# Patient Record
Sex: FEMALE | Race: WHITE | NOT HISPANIC OR LATINO | Employment: FULL TIME | URBAN - METROPOLITAN AREA
[De-identification: names, ages, dates, MRNs, and addresses within clinical notes are randomized per-mention and may not be internally consistent; named-entity substitution may affect disease eponyms.]

---

## 2019-05-15 ENCOUNTER — OCCMED (OUTPATIENT)
Dept: URGENT CARE | Age: 49
End: 2019-05-15
Payer: OTHER MISCELLANEOUS

## 2019-05-15 ENCOUNTER — APPOINTMENT (OUTPATIENT)
Dept: RADIOLOGY | Age: 49
End: 2019-05-15

## 2019-05-15 DIAGNOSIS — S89.92XA LEG INJURY, LEFT, INITIAL ENCOUNTER: ICD-10-CM

## 2019-05-15 DIAGNOSIS — S89.92XA LEG INJURY, LEFT, INITIAL ENCOUNTER: Primary | ICD-10-CM

## 2019-05-15 DIAGNOSIS — S89.91XA LEG INJURY, RIGHT, INITIAL ENCOUNTER: ICD-10-CM

## 2019-05-15 PROCEDURE — 99283 EMERGENCY DEPT VISIT LOW MDM: CPT

## 2019-05-15 PROCEDURE — G0382 LEV 3 HOSP TYPE B ED VISIT: HCPCS

## 2019-05-15 PROCEDURE — 73590 X-RAY EXAM OF LOWER LEG: CPT

## 2021-04-09 DIAGNOSIS — Z23 ENCOUNTER FOR IMMUNIZATION: ICD-10-CM

## 2022-05-22 ENCOUNTER — APPOINTMENT (OUTPATIENT)
Dept: RADIOLOGY | Age: 52
End: 2022-05-22
Payer: COMMERCIAL

## 2022-05-22 ENCOUNTER — OFFICE VISIT (OUTPATIENT)
Dept: URGENT CARE | Age: 52
End: 2022-05-22
Payer: COMMERCIAL

## 2022-05-22 VITALS
HEART RATE: 83 BPM | HEIGHT: 61 IN | SYSTOLIC BLOOD PRESSURE: 163 MMHG | OXYGEN SATURATION: 100 % | TEMPERATURE: 97.8 F | BODY MASS INDEX: 22.66 KG/M2 | WEIGHT: 120 LBS | DIASTOLIC BLOOD PRESSURE: 82 MMHG | RESPIRATION RATE: 20 BRPM

## 2022-05-22 DIAGNOSIS — S99.921A INJURY OF RIGHT FOOT, INITIAL ENCOUNTER: ICD-10-CM

## 2022-05-22 DIAGNOSIS — S90.31XA CONTUSION OF RIGHT FOOT, INITIAL ENCOUNTER: Primary | ICD-10-CM

## 2022-05-22 PROCEDURE — 99204 OFFICE O/P NEW MOD 45 MIN: CPT | Performed by: FAMILY MEDICINE

## 2022-05-22 PROCEDURE — 73630 X-RAY EXAM OF FOOT: CPT

## 2022-05-22 NOTE — PROGRESS NOTES
[address]        NAME: Ulices Bishop is a 50 y o  female  : 1970    MRN: [id number]  DATE: May 22, 2022  TIME: 3:36 PM    Assessment and Plan   Contusion of right foot, initial encounter Limadarwin Sweeney  1  Contusion of right foot, initial encounter  XR foot 3+ vw right         Patient Instructions     Contusion of the 1st metatarsal of the right foot  X-rays negative for fracture at this time  Short Cam boot given to help offload the area of injury and decreased the pain  No she may wean out of the boot as tolerated  Follow up with PCP in 3-5 days  Proceed to  ER if symptoms worsen  Chief Complaint     Chief Complaint   Patient presents with    Foot Pain     Right foot injury/pain began yesterday         History of Present Illness       82-year-old female presents today complaining of right foot pain  She states that she dropped a candle on her great toe yesterday and the pain continued today  She notes swelling and bruising in the area of injury  She is having difficulty walking due to the pain  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  Review of Systems   Review of Systems   Constitutional: Negative for chills, fatigue and fever  HENT: Negative for postnasal drip and sore throat  Respiratory: Negative for cough and shortness of breath  Cardiovascular: Negative for chest pain and palpitations  Gastrointestinal: Negative for abdominal pain, nausea and vomiting  Genitourinary: Negative for dysuria  Musculoskeletal: Positive for gait problem and joint swelling  Skin: Negative for rash  Neurological: Negative for dizziness, syncope, light-headedness, numbness and headaches  Psychiatric/Behavioral: Negative for agitation and confusion  All other systems reviewed and are negative  Current Medications     No current outpatient medications on file      Current Allergies     Allergies as of 2022 - Reviewed 2022   Allergen Reaction Noted    Sulfa antibiotics Anaphylaxis and Other (See Comments) 05/07/2008    Strawberry extract - food allergy Hives 04/08/2015            The following portions of the patient's history were reviewed and updated as appropriate: allergies, current medications, past family history, past medical history, past social history, past surgical history and problem list      History reviewed  No pertinent past medical history  History reviewed  No pertinent surgical history  History reviewed  No pertinent family history  Medications have been verified  Objective   /82   Pulse 83   Temp 97 8 °F (36 6 °C)   Resp 20   Ht 5' 1" (1 549 m)   Wt 54 4 kg (120 lb)   SpO2 100%   BMI 22 67 kg/m²   No LMP recorded  Physical Exam     Physical Exam  Vitals reviewed  Constitutional:       General: She is not in acute distress  Appearance: Normal appearance  She is not ill-appearing  HENT:      Head: Normocephalic and atraumatic  Eyes:      Extraocular Movements: Extraocular movements intact  Conjunctiva/sclera: Conjunctivae normal    Musculoskeletal:         General: Tenderness and signs of injury present  Cervical back: Normal range of motion  Right foot: Swelling and bony tenderness present  No laceration  Legs:       Comments: Bruising present over the 1st metatarsal    Skin:     General: Skin is warm  Neurological:      General: No focal deficit present  Mental Status: She is alert     Psychiatric:         Mood and Affect: Mood normal          Behavior: Behavior normal          Judgment: Judgment normal

## 2022-05-22 NOTE — PATIENT INSTRUCTIONS
Contusion in Adults   AMBULATORY CARE:   A contusion  is a bruise that appears on your skin after an injury  A bruise happens when small blood vessels tear but skin does not  Blood leaks into nearby tissue, such as soft tissue or muscle  Other signs and symptoms you may have with a contusion:   Pain that increases when you touch the bruise, walk, or use the area around the bruise    Swelling or a lump at the site of the bruise or near it    Red, blue, or black skin that may change to green or yellow after a few days    Stiffness or problems moving the bruised area of your body    Seek care immediately if:   You have new trouble moving the injured area  You have tingling or numbness in or near the injured area  Your hand or foot below the bruise gets cold or turns pale  Call your doctor if:   You find a new lump in the injured area  Your symptoms do not improve with treatment after 4 to 5 days  You have questions or concerns about your condition or care  Treatment  may not be needed  The following may be needed if you have a serious injury:  NSAIDs , such as ibuprofen, help decrease swelling, pain, and fever  This medicine is available with or without a doctor's order  NSAIDs can cause stomach bleeding or kidney problems in certain people  If you take blood thinner medicine, always ask your healthcare provider if NSAIDs are safe for you  Always read the medicine label and follow directions  Prescription pain medicine  may be given  Ask your healthcare provider how to take this medicine safely  Some prescription pain medicines contain acetaminophen  Do not take other medicines that contain acetaminophen without talking to your healthcare provider  Too much acetaminophen may cause liver damage  Prescription pain medicine may cause constipation  Ask your healthcare provider how to prevent or treat constipation  Aspiration  is a procedure to drain pooled blood in your muscle   This may help prevent increased pressure in the muscle  Surgery  may be done to repair a tear in the muscle or relieve pressure in the muscle caused by swelling  Help a contusion heal:   Rest the injured area  or use it less than usual  If you bruised your leg or foot, you may need crutches or a cane to help you walk  This will help you keep weight off your injured body part  Apply ice  to decrease swelling and pain  Ice may also help prevent tissue damage  Use an ice pack, or put crushed ice in a plastic bag  Cover it with a towel and place it on your bruise for 15 to 20 minutes every hour or as directed  Use compression  to support the area and decrease swelling  Wrap an elastic bandage around the area over the bruised muscle  Make sure the bandage is not too tight  You should be able to fit 1 finger between the bandage and your skin  Elevate (raise) your injured body part  above the level of your heart to help decrease pain and swelling  Use pillows, blankets, or rolled towels to elevate the area as often as you can  Do not drink alcohol  as directed  Alcohol may slow healing  Do not stretch injured muscles  right after your injury  Ask your healthcare provider when and how you may safely stretch after your injury  Gentle stretches can help increase your flexibility  Do not massage the area or put heating pads  on the bruise right after your injury  Heat and massage may slow healing  Your healthcare provider may tell you to apply heat after several days  At that time, heat will start to help the injury heal     Follow up with your doctor as directed:  Write down your questions so you remember to ask them during your visits  © [employer] 2022 Information is for End User's use only and may not be sold, redistributed or otherwise used for commercial purposes   All illustrations and images included in CareNotes® are the copyrighted property of [employer]  or [employer]  The above information is an  only  It is not intended as medical advice for individual conditions or treatments  Talk to your doctor, nurse or pharmacist before following any medical regimen to see if it is safe and effective for you

## 2023-02-21 ENCOUNTER — HOSPITAL ENCOUNTER (INPATIENT)
Facility: HOSPITAL | Age: 53
LOS: 1 days | Discharge: HOME | DRG: 138 | End: 2023-02-24
Attending: EMERGENCY MEDICINE | Admitting: INTERNAL MEDICINE
Payer: COMMERCIAL

## 2023-02-21 ENCOUNTER — APPOINTMENT (EMERGENCY)
Dept: CT IMAGING | Facility: HOSPITAL | Age: 53
DRG: 138 | End: 2023-02-21
Payer: COMMERCIAL

## 2023-02-21 DIAGNOSIS — K04.7 DENTAL ABSCESS: Primary | ICD-10-CM

## 2023-02-21 LAB
ALBUMIN SERPL BCP-MCNC: 4.2 G/DL (ref 3.5–5)
ALP SERPL-CCNC: 49 U/L (ref 34–104)
ALT SERPL W P-5'-P-CCNC: 8 U/L (ref 7–52)
ANION GAP SERPL CALCULATED.3IONS-SCNC: 11 MMOL/L (ref 4–13)
APTT PPP: 30 SECONDS (ref 23–37)
AST SERPL W P-5'-P-CCNC: 14 U/L (ref 13–39)
BASOPHILS # BLD AUTO: 0.06 THOUSANDS/ÂΜL (ref 0–0.1)
BASOPHILS NFR BLD AUTO: 1 % (ref 0–1)
BILIRUB SERPL-MCNC: 0.52 MG/DL (ref 0.2–1)
BUN SERPL-MCNC: 11 MG/DL (ref 5–25)
CALCIUM SERPL-MCNC: 9.4 MG/DL (ref 8.4–10.2)
CHLORIDE SERPL-SCNC: 102 MMOL/L (ref 96–108)
CO2 SERPL-SCNC: 21 MMOL/L (ref 21–32)
CREAT SERPL-MCNC: 0.87 MG/DL (ref 0.6–1.3)
EOSINOPHIL # BLD AUTO: 0 THOUSAND/ÂΜL (ref 0–0.61)
EOSINOPHIL NFR BLD AUTO: 0 % (ref 0–6)
ERYTHROCYTE [DISTWIDTH] IN BLOOD BY AUTOMATED COUNT: 12.3 % (ref 11.6–15.1)
GFR SERPL CREATININE-BSD FRML MDRD: 77 ML/MIN/1.73SQ M
GLUCOSE SERPL-MCNC: 95 MG/DL (ref 65–140)
HCT VFR BLD AUTO: 39.3 % (ref 34.8–46.1)
HGB BLD-MCNC: 13.4 G/DL (ref 11.5–15.4)
IMM GRANULOCYTES # BLD AUTO: 0.03 THOUSAND/UL (ref 0–0.2)
IMM GRANULOCYTES NFR BLD AUTO: 0 % (ref 0–2)
INR PPP: 1.03 (ref 0.84–1.19)
LACTATE SERPL-SCNC: 1.1 MMOL/L (ref 0.5–2)
LYMPHOCYTES # BLD AUTO: 0.74 THOUSANDS/ÂΜL (ref 0.6–4.47)
LYMPHOCYTES NFR BLD AUTO: 9 % (ref 14–44)
MCH RBC QN AUTO: 31.8 PG (ref 26.8–34.3)
MCHC RBC AUTO-ENTMCNC: 34.1 G/DL (ref 31.4–37.4)
MCV RBC AUTO: 93 FL (ref 82–98)
MONOCYTES # BLD AUTO: 0.57 THOUSAND/ÂΜL (ref 0.17–1.22)
MONOCYTES NFR BLD AUTO: 7 % (ref 4–12)
NEUTROPHILS # BLD AUTO: 6.99 THOUSANDS/ÂΜL (ref 1.85–7.62)
NEUTS SEG NFR BLD AUTO: 83 % (ref 43–75)
NRBC BLD AUTO-RTO: 0 /100 WBCS
PLATELET # BLD AUTO: 205 THOUSANDS/UL (ref 149–390)
PMV BLD AUTO: 10.5 FL (ref 8.9–12.7)
POTASSIUM SERPL-SCNC: 3.7 MMOL/L (ref 3.5–5.3)
PROCALCITONIN SERPL-MCNC: 0.31 NG/ML
PROT SERPL-MCNC: 7.2 G/DL (ref 6.4–8.4)
PROTHROMBIN TIME: 13.7 SECONDS (ref 11.6–14.5)
RBC # BLD AUTO: 4.21 MILLION/UL (ref 3.81–5.12)
SODIUM SERPL-SCNC: 134 MMOL/L (ref 135–147)
WBC # BLD AUTO: 8.39 THOUSAND/UL (ref 4.31–10.16)

## 2023-02-21 PROCEDURE — 96375 TX/PRO/DX INJ NEW DRUG ADDON: CPT

## 2023-02-21 PROCEDURE — 70491 CT SOFT TISSUE NECK W/DYE: CPT

## 2023-02-21 PROCEDURE — 99285 EMERGENCY DEPT VISIT HI MDM: CPT | Performed by: EMERGENCY MEDICINE

## 2023-02-21 PROCEDURE — 99285 EMERGENCY DEPT VISIT HI MDM: CPT

## 2023-02-21 PROCEDURE — 85025 COMPLETE CBC W/AUTO DIFF WBC: CPT

## 2023-02-21 PROCEDURE — 80053 COMPREHEN METABOLIC PANEL: CPT

## 2023-02-21 PROCEDURE — 96365 THER/PROPH/DIAG IV INF INIT: CPT

## 2023-02-21 PROCEDURE — 36415 COLL VENOUS BLD VENIPUNCTURE: CPT

## 2023-02-21 PROCEDURE — 87040 BLOOD CULTURE FOR BACTERIA: CPT

## 2023-02-21 PROCEDURE — 84145 PROCALCITONIN (PCT): CPT

## 2023-02-21 PROCEDURE — 83605 ASSAY OF LACTIC ACID: CPT

## 2023-02-21 PROCEDURE — 85610 PROTHROMBIN TIME: CPT

## 2023-02-21 PROCEDURE — 96361 HYDRATE IV INFUSION ADD-ON: CPT

## 2023-02-21 PROCEDURE — 85730 THROMBOPLASTIN TIME PARTIAL: CPT

## 2023-02-21 PROCEDURE — 96376 TX/PRO/DX INJ SAME DRUG ADON: CPT

## 2023-02-21 PROCEDURE — G1004 CDSM NDSC: HCPCS

## 2023-02-21 RX ORDER — ACETAMINOPHEN 325 MG/1
975 TABLET ORAL ONCE
Status: COMPLETED | OUTPATIENT
Start: 2023-02-21 | End: 2023-02-21

## 2023-02-21 RX ORDER — ONDANSETRON 2 MG/ML
4 INJECTION INTRAMUSCULAR; INTRAVENOUS ONCE
Status: COMPLETED | OUTPATIENT
Start: 2023-02-21 | End: 2023-02-21

## 2023-02-21 RX ORDER — MORPHINE SULFATE 4 MG/ML
4 INJECTION, SOLUTION INTRAMUSCULAR; INTRAVENOUS ONCE
Status: COMPLETED | OUTPATIENT
Start: 2023-02-21 | End: 2023-02-21

## 2023-02-21 RX ADMIN — SODIUM CHLORIDE 3 G: 9 INJECTION, SOLUTION INTRAVENOUS at 22:04

## 2023-02-21 RX ADMIN — ONDANSETRON 4 MG: 2 INJECTION INTRAMUSCULAR; INTRAVENOUS at 20:01

## 2023-02-21 RX ADMIN — MORPHINE SULFATE 2 MG: 2 INJECTION, SOLUTION INTRAMUSCULAR; INTRAVENOUS at 20:58

## 2023-02-21 RX ADMIN — MORPHINE SULFATE 4 MG: 4 INJECTION INTRAVENOUS at 20:01

## 2023-02-21 RX ADMIN — SODIUM CHLORIDE 1000 ML: 0.9 INJECTION, SOLUTION INTRAVENOUS at 20:01

## 2023-02-21 RX ADMIN — ACETAMINOPHEN 975 MG: 325 TABLET, FILM COATED ORAL at 20:01

## 2023-02-21 RX ADMIN — IOHEXOL 100 ML: 350 INJECTION, SOLUTION INTRAVENOUS at 20:54

## 2023-02-21 NOTE — LETTER
[address] [address]  Dept: [phone]    February 24, 2023     Patient: Antoine Tarango   YOB: 1970   Date of Visit: 2/21/2023       To Whom it May Concern:    Mercedes Souza is under my professional care  She was seen in the hospital from 2/21/2023 to 02/24/23  She may return to work on Monday 2/27 without limitations  If you have any questions or concerns, please don't hesitate to call           Sincerely,            Eric Quesada PA-C

## 2023-02-22 ENCOUNTER — ANESTHESIA (OUTPATIENT)
Dept: PERIOP | Facility: HOSPITAL | Age: 53
End: 2023-02-22

## 2023-02-22 ENCOUNTER — ANESTHESIA EVENT (OUTPATIENT)
Dept: PERIOP | Facility: HOSPITAL | Age: 53
End: 2023-02-22

## 2023-02-22 PROBLEM — K04.7 DENTAL ABSCESS: Status: ACTIVE | Noted: 2023-02-22

## 2023-02-22 PROBLEM — E87.1 HYPONATREMIA: Status: ACTIVE | Noted: 2023-02-22

## 2023-02-22 PROBLEM — K08.89 PAIN, DENTAL: Status: ACTIVE | Noted: 2023-02-22

## 2023-02-22 LAB
ANION GAP SERPL CALCULATED.3IONS-SCNC: 8 MMOL/L (ref 4–13)
BASOPHILS # BLD AUTO: 0.06 THOUSANDS/ÂΜL (ref 0–0.1)
BASOPHILS NFR BLD AUTO: 1 % (ref 0–1)
BUN SERPL-MCNC: 11 MG/DL (ref 5–25)
CALCIUM SERPL-MCNC: 8.2 MG/DL (ref 8.4–10.2)
CHLORIDE SERPL-SCNC: 107 MMOL/L (ref 96–108)
CO2 SERPL-SCNC: 22 MMOL/L (ref 21–32)
CREAT SERPL-MCNC: 0.91 MG/DL (ref 0.6–1.3)
EOSINOPHIL # BLD AUTO: 0.05 THOUSAND/ÂΜL (ref 0–0.61)
EOSINOPHIL NFR BLD AUTO: 1 % (ref 0–6)
ERYTHROCYTE [DISTWIDTH] IN BLOOD BY AUTOMATED COUNT: 12.6 % (ref 11.6–15.1)
GFR SERPL CREATININE-BSD FRML MDRD: 73 ML/MIN/1.73SQ M
GLUCOSE P FAST SERPL-MCNC: 84 MG/DL (ref 65–99)
GLUCOSE SERPL-MCNC: 84 MG/DL (ref 65–140)
HCT VFR BLD AUTO: 35.7 % (ref 34.8–46.1)
HGB BLD-MCNC: 11.7 G/DL (ref 11.5–15.4)
IMM GRANULOCYTES # BLD AUTO: 0.04 THOUSAND/UL (ref 0–0.2)
IMM GRANULOCYTES NFR BLD AUTO: 1 % (ref 0–2)
LYMPHOCYTES # BLD AUTO: 1.49 THOUSANDS/ÂΜL (ref 0.6–4.47)
LYMPHOCYTES NFR BLD AUTO: 21 % (ref 14–44)
MCH RBC QN AUTO: 31.2 PG (ref 26.8–34.3)
MCHC RBC AUTO-ENTMCNC: 32.8 G/DL (ref 31.4–37.4)
MCV RBC AUTO: 95 FL (ref 82–98)
MONOCYTES # BLD AUTO: 1.01 THOUSAND/ÂΜL (ref 0.17–1.22)
MONOCYTES NFR BLD AUTO: 14 % (ref 4–12)
NEUTROPHILS # BLD AUTO: 4.35 THOUSANDS/ÂΜL (ref 1.85–7.62)
NEUTS SEG NFR BLD AUTO: 62 % (ref 43–75)
NRBC BLD AUTO-RTO: 0 /100 WBCS
PLATELET # BLD AUTO: 170 THOUSANDS/UL (ref 149–390)
PMV BLD AUTO: 10.2 FL (ref 8.9–12.7)
POTASSIUM SERPL-SCNC: 3.7 MMOL/L (ref 3.5–5.3)
RBC # BLD AUTO: 3.75 MILLION/UL (ref 3.81–5.12)
SODIUM SERPL-SCNC: 137 MMOL/L (ref 135–147)
WBC # BLD AUTO: 7 THOUSAND/UL (ref 4.31–10.16)

## 2023-02-22 PROCEDURE — RECHECK: Performed by: PHYSICIAN ASSISTANT

## 2023-02-22 PROCEDURE — 0J910ZZ DRAINAGE OF FACE SUBCUTANEOUS TISSUE AND FASCIA, OPEN APPROACH: ICD-10-PCS | Performed by: DENTIST

## 2023-02-22 PROCEDURE — 0W930ZZ DRAINAGE OF ORAL CAVITY AND THROAT, OPEN APPROACH: ICD-10-PCS | Performed by: DENTIST

## 2023-02-22 PROCEDURE — 87070 CULTURE OTHR SPECIMN AEROBIC: CPT | Performed by: DENTIST

## 2023-02-22 PROCEDURE — 0CDWXZ0 EXTRACTION OF UPPER TOOTH, SINGLE, EXTERNAL APPROACH: ICD-10-PCS | Performed by: DENTIST

## 2023-02-22 PROCEDURE — 87205 SMEAR GRAM STAIN: CPT | Performed by: DENTIST

## 2023-02-22 PROCEDURE — 85025 COMPLETE CBC W/AUTO DIFF WBC: CPT | Performed by: PHYSICIAN ASSISTANT

## 2023-02-22 PROCEDURE — 80048 BASIC METABOLIC PNL TOTAL CA: CPT | Performed by: PHYSICIAN ASSISTANT

## 2023-02-22 PROCEDURE — 99222 1ST HOSP IP/OBS MODERATE 55: CPT | Performed by: INTERNAL MEDICINE

## 2023-02-22 PROCEDURE — 87075 CULTR BACTERIA EXCEPT BLOOD: CPT | Performed by: DENTIST

## 2023-02-22 RX ORDER — ONDANSETRON 2 MG/ML
INJECTION INTRAMUSCULAR; INTRAVENOUS AS NEEDED
Status: DISCONTINUED | OUTPATIENT
Start: 2023-02-22 | End: 2023-02-22

## 2023-02-22 RX ORDER — PROPOFOL 10 MG/ML
INJECTION, EMULSION INTRAVENOUS AS NEEDED
Status: DISCONTINUED | OUTPATIENT
Start: 2023-02-22 | End: 2023-02-22

## 2023-02-22 RX ORDER — ACETAMINOPHEN 325 MG/1
650 TABLET ORAL EVERY 6 HOURS PRN
Status: DISCONTINUED | OUTPATIENT
Start: 2023-02-22 | End: 2023-02-22

## 2023-02-22 RX ORDER — FENTANYL CITRATE 50 UG/ML
INJECTION, SOLUTION INTRAMUSCULAR; INTRAVENOUS AS NEEDED
Status: DISCONTINUED | OUTPATIENT
Start: 2023-02-22 | End: 2023-02-22

## 2023-02-22 RX ORDER — ONDANSETRON 2 MG/ML
4 INJECTION INTRAMUSCULAR; INTRAVENOUS ONCE AS NEEDED
Status: DISCONTINUED | OUTPATIENT
Start: 2023-02-22 | End: 2023-02-22 | Stop reason: HOSPADM

## 2023-02-22 RX ORDER — BACITRACIN, NEOMYCIN, POLYMYXIN B 400; 3.5; 5 [USP'U]/G; MG/G; [USP'U]/G
OINTMENT TOPICAL AS NEEDED
Status: DISCONTINUED | OUTPATIENT
Start: 2023-02-22 | End: 2023-02-22 | Stop reason: HOSPADM

## 2023-02-22 RX ORDER — BUPIVACAINE HYDROCHLORIDE 2.5 MG/ML
INJECTION, SOLUTION EPIDURAL; INFILTRATION; INTRACAUDAL AS NEEDED
Status: DISCONTINUED | OUTPATIENT
Start: 2023-02-22 | End: 2023-02-22 | Stop reason: HOSPADM

## 2023-02-22 RX ORDER — MIDAZOLAM HYDROCHLORIDE 2 MG/2ML
INJECTION, SOLUTION INTRAMUSCULAR; INTRAVENOUS AS NEEDED
Status: DISCONTINUED | OUTPATIENT
Start: 2023-02-22 | End: 2023-02-22

## 2023-02-22 RX ORDER — MAGNESIUM HYDROXIDE 1200 MG/15ML
LIQUID ORAL AS NEEDED
Status: DISCONTINUED | OUTPATIENT
Start: 2023-02-22 | End: 2023-02-22 | Stop reason: HOSPADM

## 2023-02-22 RX ORDER — METOCLOPRAMIDE HYDROCHLORIDE 5 MG/ML
10 INJECTION INTRAMUSCULAR; INTRAVENOUS ONCE AS NEEDED
Status: DISCONTINUED | OUTPATIENT
Start: 2023-02-22 | End: 2023-02-22 | Stop reason: HOSPADM

## 2023-02-22 RX ORDER — LIDOCAINE HYDROCHLORIDE 10 MG/ML
INJECTION, SOLUTION EPIDURAL; INFILTRATION; INTRACAUDAL; PERINEURAL AS NEEDED
Status: DISCONTINUED | OUTPATIENT
Start: 2023-02-22 | End: 2023-02-22 | Stop reason: HOSPADM

## 2023-02-22 RX ORDER — DEXAMETHASONE SODIUM PHOSPHATE 10 MG/ML
INJECTION, SOLUTION INTRAMUSCULAR; INTRAVENOUS AS NEEDED
Status: DISCONTINUED | OUTPATIENT
Start: 2023-02-22 | End: 2023-02-22

## 2023-02-22 RX ORDER — KETOROLAC TROMETHAMINE 30 MG/ML
15 INJECTION, SOLUTION INTRAMUSCULAR; INTRAVENOUS EVERY 6 HOURS PRN
Status: DISCONTINUED | OUTPATIENT
Start: 2023-02-22 | End: 2023-02-22

## 2023-02-22 RX ORDER — LIDOCAINE HYDROCHLORIDE 10 MG/ML
INJECTION, SOLUTION EPIDURAL; INFILTRATION; INTRACAUDAL; PERINEURAL AS NEEDED
Status: DISCONTINUED | OUTPATIENT
Start: 2023-02-22 | End: 2023-02-22

## 2023-02-22 RX ORDER — KETOROLAC TROMETHAMINE 30 MG/ML
15 INJECTION, SOLUTION INTRAMUSCULAR; INTRAVENOUS ONCE
Status: COMPLETED | OUTPATIENT
Start: 2023-02-22 | End: 2023-02-22

## 2023-02-22 RX ORDER — OXYCODONE HYDROCHLORIDE 5 MG/1
5 TABLET ORAL EVERY 6 HOURS PRN
Status: DISCONTINUED | OUTPATIENT
Start: 2023-02-22 | End: 2023-02-22

## 2023-02-22 RX ORDER — ACETAMINOPHEN 325 MG/1
975 TABLET ORAL EVERY 8 HOURS SCHEDULED
Status: DISCONTINUED | OUTPATIENT
Start: 2023-02-22 | End: 2023-02-24 | Stop reason: HOSPADM

## 2023-02-22 RX ORDER — KETOROLAC TROMETHAMINE 30 MG/ML
15 INJECTION, SOLUTION INTRAMUSCULAR; INTRAVENOUS EVERY 6 HOURS PRN
Status: DISPENSED | OUTPATIENT
Start: 2023-02-22 | End: 2023-02-23

## 2023-02-22 RX ORDER — HYDROMORPHONE HCL IN WATER/PF 6 MG/30 ML
0.2 PATIENT CONTROLLED ANALGESIA SYRINGE INTRAVENOUS EVERY 4 HOURS PRN
Status: DISCONTINUED | OUTPATIENT
Start: 2023-02-22 | End: 2023-02-24 | Stop reason: HOSPADM

## 2023-02-22 RX ORDER — SODIUM CHLORIDE, SODIUM LACTATE, POTASSIUM CHLORIDE, CALCIUM CHLORIDE 600; 310; 30; 20 MG/100ML; MG/100ML; MG/100ML; MG/100ML
INJECTION, SOLUTION INTRAVENOUS CONTINUOUS PRN
Status: DISCONTINUED | OUTPATIENT
Start: 2023-02-22 | End: 2023-02-22

## 2023-02-22 RX ORDER — HYDROMORPHONE HCL/PF 1 MG/ML
0.5 SYRINGE (ML) INJECTION EVERY 4 HOURS PRN
Status: DISCONTINUED | OUTPATIENT
Start: 2023-02-22 | End: 2023-02-22

## 2023-02-22 RX ORDER — SUCCINYLCHOLINE/SOD CL,ISO/PF 100 MG/5ML
SYRINGE (ML) INTRAVENOUS AS NEEDED
Status: DISCONTINUED | OUTPATIENT
Start: 2023-02-22 | End: 2023-02-22

## 2023-02-22 RX ORDER — HYDROMORPHONE HCL/PF 1 MG/ML
0.5 SYRINGE (ML) INJECTION
Status: COMPLETED | OUTPATIENT
Start: 2023-02-22 | End: 2023-02-22

## 2023-02-22 RX ADMIN — DEXAMETHASONE SODIUM PHOSPHATE 10 MG: 10 INJECTION, SOLUTION INTRAMUSCULAR; INTRAVENOUS at 18:08

## 2023-02-22 RX ADMIN — ACETAMINOPHEN 975 MG: 325 TABLET ORAL at 21:24

## 2023-02-22 RX ADMIN — KETOROLAC TROMETHAMINE 15 MG: 30 INJECTION, SOLUTION INTRAMUSCULAR at 21:24

## 2023-02-22 RX ADMIN — ACETAMINOPHEN 975 MG: 325 TABLET ORAL at 14:15

## 2023-02-22 RX ADMIN — ACETAMINOPHEN 975 MG: 325 TABLET ORAL at 07:53

## 2023-02-22 RX ADMIN — MORPHINE SULFATE 2 MG: 2 INJECTION, SOLUTION INTRAMUSCULAR; INTRAVENOUS at 00:27

## 2023-02-22 RX ADMIN — Medication 80 MG: at 18:05

## 2023-02-22 RX ADMIN — OXYCODONE HYDROCHLORIDE 5 MG: 5 TABLET ORAL at 04:08

## 2023-02-22 RX ADMIN — FENTANYL CITRATE 100 MCG: 50 INJECTION, SOLUTION INTRAMUSCULAR; INTRAVENOUS at 18:04

## 2023-02-22 RX ADMIN — PROPOFOL 150 MG: 10 INJECTION, EMULSION INTRAVENOUS at 18:04

## 2023-02-22 RX ADMIN — SODIUM CHLORIDE 3 G: 9 INJECTION, SOLUTION INTRAVENOUS at 18:08

## 2023-02-22 RX ADMIN — SODIUM CHLORIDE 3 G: 9 INJECTION, SOLUTION INTRAVENOUS at 23:56

## 2023-02-22 RX ADMIN — SODIUM CHLORIDE 3 G: 9 INJECTION, SOLUTION INTRAVENOUS at 11:02

## 2023-02-22 RX ADMIN — ONDANSETRON 4 MG: 2 INJECTION INTRAMUSCULAR; INTRAVENOUS at 18:08

## 2023-02-22 RX ADMIN — HYDROMORPHONE HYDROCHLORIDE 0.5 MG: 1 INJECTION, SOLUTION INTRAMUSCULAR; INTRAVENOUS; SUBCUTANEOUS at 18:52

## 2023-02-22 RX ADMIN — KETOROLAC TROMETHAMINE 15 MG: 30 INJECTION, SOLUTION INTRAMUSCULAR at 14:15

## 2023-02-22 RX ADMIN — HYDROMORPHONE HYDROCHLORIDE 0.5 MG: 1 INJECTION, SOLUTION INTRAMUSCULAR; INTRAVENOUS; SUBCUTANEOUS at 19:43

## 2023-02-22 RX ADMIN — KETOROLAC TROMETHAMINE 15 MG: 30 INJECTION, SOLUTION INTRAMUSCULAR at 00:26

## 2023-02-22 RX ADMIN — HYDROMORPHONE HYDROCHLORIDE 0.5 MG: 1 INJECTION, SOLUTION INTRAMUSCULAR; INTRAVENOUS; SUBCUTANEOUS at 19:21

## 2023-02-22 RX ADMIN — MIDAZOLAM HYDROCHLORIDE 2 MG: 1 INJECTION, SOLUTION INTRAMUSCULAR; INTRAVENOUS at 17:59

## 2023-02-22 RX ADMIN — LIDOCAINE HYDROCHLORIDE 40 MG: 10 INJECTION, SOLUTION EPIDURAL; INFILTRATION; INTRACAUDAL; PERINEURAL at 18:04

## 2023-02-22 RX ADMIN — HYDROMORPHONE HYDROCHLORIDE 0.5 MG: 1 INJECTION, SOLUTION INTRAMUSCULAR; INTRAVENOUS; SUBCUTANEOUS at 19:05

## 2023-02-22 RX ADMIN — SODIUM CHLORIDE 3 G: 9 INJECTION, SOLUTION INTRAVENOUS at 04:08

## 2023-02-22 RX ADMIN — SODIUM CHLORIDE, SODIUM LACTATE, POTASSIUM CHLORIDE, AND CALCIUM CHLORIDE: .6; .31; .03; .02 INJECTION, SOLUTION INTRAVENOUS at 17:51

## 2023-02-22 RX ADMIN — KETOROLAC TROMETHAMINE 15 MG: 30 INJECTION, SOLUTION INTRAMUSCULAR at 07:53

## 2023-02-22 NOTE — PROGRESS NOTES
[facility]  Progress Note - Jose Genera 1970, 48 y o  female MRN: [id number]  Unit/Bed#: W -01 Encounter: [id number]  Primary Care Provider: Avinash Olivera   Date and time admitted to hospital: 2/21/2023  7:18 PM    * Dental abscess  Assessment & Plan  · Presented with left lower tooth pain, left sided facial swelling and subjective fevers/ chills  · CT neck shows left mandibular subperiosteal 1 4 cm abscess along the lingual cortex of the mandible  No inflammation in the left sublingual or submandibular spaces or oral cavity  Periapical abscess involving the left mandibular 1st molar suggesting odontogenic source of infection  · Continue IV Unasyn  · OMFS consult  · Keep NPO  · Pain control  · Follow up blood cultures        Hyponatremia  Assessment & Plan  · Na 134 on presentation  Now resolved   · Received 1L bolus of NS in the ED  · Monitor BMP           VTE Pharmacologic Prophylaxis: VTE Score: 2 Low Risk (Score 0-2) - Encourage Ambulation  Patient Centered Rounds: I performed bedside rounds with nursing staff today  Discussions with Specialists or Other Care Team Provider: Discussed with RN, CM    Education and Discussions with Family / Patient: Patient declined call to   Total Time Spent on Date of Encounter in care of patient: 35 minutes This time was spent on one or more of the following: performing physical exam; counseling and coordination of care; obtaining or reviewing history; documenting in the medical record; reviewing/ordering tests, medications or procedures; communicating with other healthcare professionals and discussing with patient's family/caregivers      Current Length of Stay: 0 day(s)  Current Patient Status: Observation   Certification Statement: The patient will continue to require additional inpatient hospital stay due to pending OMFS intervention   Discharge Plan: Anticipate discharge in 24-48 hrs to home     Code Status: Level 1 - Full Code    Subjective:   Patient reports pain is controlled when she has her pain meds  Denies fevers or chills  Reports being thirsty  Objective:     Vitals:   Temp (24hrs), Av 2 °F (37 3 °C), Min:98 4 °F (36 9 °C), Max:100 °F (37 8 °C)    Temp:  [98 4 °F (36 9 °C)-100 °F (37 8 °C)] 98 4 °F (36 9 °C)  HR:  [] 66  Resp:  [18] 18  BP: (106-132)/(66-87) 128/84  SpO2:  [96 %-98 %] 98 %  Body mass index is 20 49 kg/m²  Input and Output Summary (last 24 hours): Intake/Output Summary (Last 24 hours) at 2023 0737  Last data filed at 2023 2244  Gross per 24 hour   Intake 1100 ml   Output --   Net 1100 ml       Physical Exam:   Physical Exam  Constitutional:       General: She is not in acute distress  Appearance: Normal appearance  She is normal weight  She is not ill-appearing or diaphoretic  HENT:      Head: Normocephalic and atraumatic  Mouth/Throat:      Mouth: Mucous membranes are moist       Dentition: Dental caries and dental abscesses present  Eyes:      General: No scleral icterus  Pupils: Pupils are equal, round, and reactive to light  Cardiovascular:      Rate and Rhythm: Normal rate and regular rhythm  Pulses: Normal pulses  Heart sounds: Normal heart sounds, S1 normal and S2 normal  No murmur heard  No systolic murmur is present  No diastolic murmur is present  No gallop  No S3 or S4 sounds  Pulmonary:      Effort: Pulmonary effort is normal  No accessory muscle usage or respiratory distress  Breath sounds: Normal breath sounds  No stridor  No decreased breath sounds, wheezing, rhonchi or rales  Chest:      Chest wall: No tenderness  Abdominal:      General: Bowel sounds are normal  There is no distension  Palpations: Abdomen is soft  Tenderness: There is no abdominal tenderness  There is no guarding  Musculoskeletal:      Right lower leg: No edema  Left lower leg: No edema  Skin:     General: Skin is warm and dry  Coloration: Skin is not jaundiced  Neurological:      General: No focal deficit present  Mental Status: She is alert  Mental status is at baseline  Motor: No tremor or seizure activity  Psychiatric:         Behavior: Behavior is cooperative  Additional Data:     Labs:  Results from last 7 days   Lab Units 02/22/23  0430   WBC Thousand/uL 7 00   HEMOGLOBIN g/dL 11 7   HEMATOCRIT % 35 7   PLATELETS Thousands/uL 170   NEUTROS PCT % 62   LYMPHS PCT % 21   MONOS PCT % 14*   EOS PCT % 1     Results from last 7 days   Lab Units 02/22/23 0430 02/21/23 2000   SODIUM mmol/L 137 134*   POTASSIUM mmol/L 3 7 3 7   CHLORIDE mmol/L 107 102   CO2 mmol/L 22 21   BUN mg/dL 11 11   CREATININE mg/dL 0 91 0 87   ANION GAP mmol/L 8 11   CALCIUM mg/dL 8 2* 9 4   ALBUMIN g/dL  --  4 2   TOTAL BILIRUBIN mg/dL  --  0 52   ALK PHOS U/L  --  49   ALT U/L  --  8   AST U/L  --  14   GLUCOSE RANDOM mg/dL 84 95     Results from last 7 days   Lab Units 02/21/23 2000   INR  1 03             Results from last 7 days   Lab Units 02/21/23 2000   LACTIC ACID mmol/L 1 1   PROCALCITONIN ng/ml 0 31*       Lines/Drains:  Invasive Devices     Peripheral Intravenous Line  Duration           Peripheral IV 02/21/23 Proximal;Right;Ventral (anterior) Forearm <1 day                      Imaging: No pertinent imaging reviewed  Recent Cultures (last 7 days):   Results from last 7 days   Lab Units 02/21/23 2000   BLOOD CULTURE  Received in Microbiology Lab  Culture in Progress  Received in Microbiology Lab  Culture in Progress         Last 24 Hours Medication List:   Current Facility-Administered Medications   Medication Dose Route Frequency Provider Last Rate   • acetaminophen  975 mg Oral Q8H Alejandra Divers PA-C     • ampicillin-sulbactam  3 g Intravenous Q6H Cathleen Serum, PA-C 3 g (02/22/23 8674)   • ketorolac  15 mg Intravenous Q6H PRN Cathleen Serum, PA-C     • ketorolac  15 mg Intravenous Q6H PRN Fili Bonilla PA-C     • oxyCODONE  5 mg Oral Q6H PRN Eric Reyes PA-C          Today, Patient Was Seen By: Fili Bonilla PA-C    **Please Note: This note may have been constructed using a voice recognition system  **

## 2023-02-22 NOTE — OP NOTE
OPERATIVE REPORT  PATIENT NAME: Sultana Marquez    :  1970  MRN: [id number]  Pt Location: AN OR ROOM 03    SURGERY DATE: 2023    Surgeon(s) and Role:     Yaima Griggs DMD - Primary    Preop Diagnosis:  Dental abscess [K04 7]    Post-Op Diagnosis Codes:     * Dental abscess [K04 7]      Procedures:  Removal root tips tooth #18(it should be noted this was documented as 23 at the time of consultation however exam was limited secondary to trismus)  Extraoral incision and drainage left sublingual space  Extraoral incision drainage left submandibular space  Extraoral incision and drainage left  space  Extraoral incision and drainage submental space  Intraoral incision and drainage left sublingual space  Intraoral incision and drainage left submandibular space  Intraoral incision and drainage dentoalveolar structure associate with tooth #18    Specimen(s):  ID Type Source Tests Collected by Time Destination   A : Left Facial Wound Tissue Soft Tissue, Other ANAEROBIC CULTURE AND GRAM STAIN, CULTURE, TISSUE AND GRAM STAIN Nate Allred DMD 2023 1820        Estimated Blood Loss:   Minimal    Drains:  No drains    Anesthesia Type:   General    Operative Indications:  Dental abscess [K04 7]      Operative Findings:  Root tips tooth #66    Complications:   None    Procedure and Technique:  The patient was greeted in the preoperative area  All the risks and benefits of the procedure were once again explained and the risks of sinus communication as well as lower chin and lip numbness were explained in detail all questions were answered  Consent had already been signed  Care was then handed back to the anesthesia team     The patient was brought into the operating room by the anesthesia team and the patient was placed in a supine position where the patient remained for the rest of the case  Anesthesia was able to establish an orotracheal intubation without any complications   Care was then handed back to the OMFS team     Patient was draped in sterile manner timeout was performed in which the patient was correctly identified by name medical record number as well as a site of the procedure be performed  Patient had received preoperative  mg clindamycin Antibiotics  Once a timeout was completed oral cavity was thoroughly suctioned with the impokkauer suction the moist  packing was used it as a throat pack  Patient was given local anesthesia at the sites of the extractions with 1% lidocaine with 1-100,000 epinephrine as local anesthesia per anesthesia record  Patient was also given approximately half percent Marcaine with 1-200,000 epinephrine also at the sites per anesthesia record  A periosteal elevator was used to separate the gingiva from the teeth  Full thickness mucoperiosteal flaps elevated at all extraction sites  Bone was removed around the teeth  Root tips were sectioned  then extracted without complication  Surgical sites were thoroughly curetted, bone filed, and irrigated with sterile saline  Closure with 3-0 chromic gut sutures  Next the oral cavity was thoroughly irrigated with sterile saline and suctioned with the impokkauer suction  The moist throat packing was removed and the oropharynx was suctioned       I was present for the entire procedure    Patient Disposition:  PACU , hemodynamically stable and extubated and stable        SIGNATURE: Maureen Concepcion DMD  DATE: February 22, 2023  TIME: 6:22 PM irrigated with sterile saline and suctioned with the Yankauer suction  The moist throat packing was removed and the oropharynx was suctioned       I was present for the entire procedure    Patient Disposition:  PACU , hemodynamically stable and extubated and stable        SIGNATURE: Rambo Cruz DMD  DATE: February 22, 2023  TIME: 6:22 PM

## 2023-02-22 NOTE — ED PROVIDER NOTES
History  Chief Complaint   Patient presents with   • Dental Pain     PT lost a cap and now there might be an abscess  PT also c/o pain, chills, and feeling feverish     52yo F presenting for dental pain  3d ago, Pt's left molar cracked  She attempted to remedy it by placing an artifical filling over it  It didn't cause her issue until yesterday, wherin she developed chills, felt feverish, and had excruciating dental pain  Today she noticed the development of soft-tissue swelling under her chin  Has had some mild dysphagia  Denies sore throat, drooling, trismus, neck pain/stiffness, cough  History provided by:  Patient      None       Past Medical History:   Diagnosis Date   • Mitral valve disease        Past Surgical History:   Procedure Laterality Date   • CYSTOSCOPY     • INCISION AND DRAINAGE OF WOUND Left 2/22/2023    Procedure: INCISION AND DRAINAGE (I&D) HEAD/FACE;  Surgeon: Navid Darnell DMD;  Location: [facility];  Service: Maxillofacial   • MULTIPLE TOOTH EXTRACTIONS N/A 2/22/2023    Procedure: EXTRACTION TEETH MULTIPLE;  Surgeon: Navid Darnell DMD;  Location[facility];  Service: Maxillofacial   • SPINAL FUSION         History reviewed  No pertinent family history  I have reviewed and agree with the history as documented  E-Cigarette/Vaping   • E-Cigarette Use Never User      E-Cigarette/Vaping Substances     Social History     Tobacco Use   • Smoking status: Never   • Smokeless tobacco: Never   Vaping Use   • Vaping Use: Never used   Substance Use Topics   • Alcohol use: Yes     Comment: rare   • Drug use: Never        Review of Systems   Constitutional: Positive for chills and fever  HENT: Positive for dental problem, facial swelling and trouble swallowing  Negative for drooling, sore throat and voice change  Respiratory: Negative  Cardiovascular: Negative  Gastrointestinal: Negative  Genitourinary: Negative  Musculoskeletal: Negative  Skin: Negative      Neurological: Negative  Psychiatric/Behavioral: Negative  Physical Exam  ED Triage Vitals [02/21/23 1850]   Temperature Pulse Respirations Blood Pressure SpO2   100 °F (37 8 °C) (!) 109 18 132/87 98 %      Temp Source Heart Rate Source Patient Position - Orthostatic VS BP Location FiO2 (%)   Oral Monitor Sitting Right arm --      Pain Score       10 - Worst Possible Pain             Orthostatic Vital Signs  Vitals:    02/23/23 0329 02/23/23 0759 02/23/23 1603 02/23/23 2158   BP: 99/63 121/73 112/72 106/66   Pulse: 65 71 72 55   Patient Position - Orthostatic VS: Lying          Physical Exam  Constitutional:       General: She is not in acute distress  Appearance: Normal appearance  HENT:      Head: Normocephalic and atraumatic  Right Ear: External ear normal       Left Ear: External ear normal       Nose: Nose normal  No rhinorrhea  Mouth/Throat:      Mouth: Mucous membranes are moist  No oral lesions or angioedema  Eyes:      Extraocular Movements: Extraocular movements intact  Conjunctiva/sclera: Conjunctivae normal    Neck:      Comments: Inferior aspect of the left mandible ttp and edematous  Cardiovascular:      Rate and Rhythm: Regular rhythm  Tachycardia present  Pulses: Normal pulses  Heart sounds: Normal heart sounds  Pulmonary:      Effort: Pulmonary effort is normal       Breath sounds: Normal breath sounds  Abdominal:      General: Abdomen is flat  Palpations: Abdomen is soft  Musculoskeletal:      Cervical back: Normal range of motion  Skin:     General: Skin is warm and dry  Capillary Refill: Capillary refill takes less than 2 seconds  Neurological:      General: No focal deficit present  Mental Status: She is alert and oriented to person, place, and time     Psychiatric:         Mood and Affect: Mood normal          Behavior: Behavior normal          ED Medications  Medications   ketorolac (TORADOL) injection 15 mg (15 mg Intravenous Given 2/23/23 0507)   ampicillin-sulbactam (UNASYN) 3 g in sodium chloride 0 9 % 100 mL IVPB (3 g Intravenous New Bag 2/24/23 0545)   acetaminophen (TYLENOL) tablet 975 mg (975 mg Oral Given 2/24/23 0546)   oxyCODONE (ROXICODONE) split tablet 2 5 mg (2 5 mg Oral Given 2/23/23 1752)   HYDROmorphone HCl (DILAUDID) injection 0 2 mg (has no administration in time range)   ketorolac (TORADOL) injection 15 mg (15 mg Intravenous Given 2/24/23 0549)   ondansetron (ZOFRAN) injection 4 mg (4 mg Intravenous Given 2/24/23 0147)   ondansetron (ZOFRAN) injection 4 mg (4 mg Intravenous Given 2/21/23 2001)   sodium chloride 0 9 % bolus 1,000 mL (0 mL Intravenous Stopped 2/21/23 2145)   morphine injection 4 mg (4 mg Intravenous Given 2/21/23 2001)   acetaminophen (TYLENOL) tablet 975 mg (975 mg Oral Given 2/21/23 2001)   morphine injection 2 mg (2 mg Intravenous Given 2/21/23 2058)   iohexol (OMNIPAQUE) 350 MG/ML injection (SINGLE-DOSE) 100 mL (100 mL Intravenous Given 2/21/23 2054)   ampicillin-sulbactam (UNASYN) 3 g in sodium chloride 0 9 % 100 mL IVPB (0 g Intravenous Stopped 2/21/23 2244)   morphine injection 2 mg (2 mg Intravenous Given 2/22/23 0027)   ketorolac (TORADOL) injection 15 mg (15 mg Intravenous Given 2/22/23 0026)   HYDROmorphone (DILAUDID) injection 0 5 mg (0 5 mg Intravenous Given 2/22/23 1943)       Diagnostic Studies  Results Reviewed     Procedure Component Value Units Date/Time    Blood culture #1 [[id number]] Collected: 02/21/23 2000    Lab Status: Preliminary result Specimen: Blood from Arm, Right Updated: 02/24/23 0001     Blood Culture No Growth at 48 hrs  Blood culture #2 [[id number]] Collected: 02/21/23 2000    Lab Status: Preliminary result Specimen: Blood from Arm, Left Updated: 02/24/23 0001     Blood Culture No Growth at 48 hrs      Procalcitonin [[id number]]  (Abnormal) Collected: 02/21/23 2000    Lab Status: Final result Specimen: Blood from Arm, Right Updated: 02/21/23 2042     Procalcitonin 0 31 ng/ml Lactic acid [[id number]]  (Normal) Collected: 02/21/23 2000    Lab Status: Final result Specimen: Blood from Arm, Right Updated: 02/21/23 2036     LACTIC ACID 1 1 mmol/L     Narrative:      Result may be elevated if tourniquet was used during collection      Comprehensive metabolic panel [[id number]]  (Abnormal) Collected: 02/21/23 2000    Lab Status: Final result Specimen: Blood from Arm, Right Updated: 02/21/23 2035     Sodium 134 mmol/L      Potassium 3 7 mmol/L      Chloride 102 mmol/L      CO2 21 mmol/L      ANION GAP 11 mmol/L      BUN 11 mg/dL      Creatinine 0 87 mg/dL      Glucose 95 mg/dL      Calcium 9 4 mg/dL      AST 14 U/L      ALT 8 U/L      Alkaline Phosphatase 49 U/L      Total Protein 7 2 g/dL      Albumin 4 2 g/dL      Total Bilirubin 0 52 mg/dL      eGFR 77 ml/min/1 73sq m     Narrative:      [facility] guidelines for Chronic Kidney Disease (CKD):   •  Stage 1 with normal or high GFR (GFR > 90 mL/min/1 73 square meters)  •  Stage 2 Mild CKD (GFR = 60-89 mL/min/1 73 square meters)  •  Stage 3A Moderate CKD (GFR = 45-59 mL/min/1 73 square meters)  •  Stage 3B Moderate CKD (GFR = 30-44 mL/min/1 73 square meters)  •  Stage 4 Severe CKD (GFR = 15-29 mL/min/1 73 square meters)  •  Stage 5 End Stage CKD (GFR <15 mL/min/1 73 square meters)  Note: GFR calculation is accurate only with a steady state creatinine    Protime-INR [[id number]]  (Normal) Collected: 02/21/23 2000    Lab Status: Final result Specimen: Blood from Arm, Right Updated: 02/21/23 2028     Protime 13 7 seconds      INR 1 03    APTT [[id number]]  (Normal) Collected: 02/21/23 2000    Lab Status: Final result Specimen: Blood from Arm, Right Updated: 02/21/23 2028     PTT 30 seconds     CBC and differential [[id number]]  (Abnormal) Collected: 02/21/23 2000    Lab Status: Final result Specimen: Blood from Arm, Right Updated: 02/21/23 2014     WBC 8 39 Thousand/uL      RBC 4 21 Million/uL      Hemoglobin 13 4 g/dL Hematocrit 39 3 %      MCV 93 fL      MCH 31 8 pg      MCHC 34 1 g/dL      RDW 12 3 %      MPV 10 5 fL      Platelets 539 Thousands/uL      nRBC 0 /100 WBCs      Neutrophils Relative 83 %      Immat GRANS % 0 %      Lymphocytes Relative 9 %      Monocytes Relative 7 %      Eosinophils Relative 0 %      Basophils Relative 1 %      Neutrophils Absolute 6 99 Thousands/µL      Immature Grans Absolute 0 03 Thousand/uL      Lymphocytes Absolute 0 74 Thousands/µL      Monocytes Absolute 0 57 Thousand/µL      Eosinophils Absolute 0 00 Thousand/µL      Basophils Absolute 0 06 Thousands/µL                  CT soft tissue neck w contrast   Final Result by Baldo Briceño MD (02/21 2249)         1  Left mandibular subperiosteal 1 4 cm abscess along the lingual cortex of the mandible  No inflammation in the left sublingual or submandibular spaces or oral cavity  2   Periapical abscess involving the left mandibular 1st molar suggesting odontogenic source of infection  Workstation performed: [id number]               Procedures  Procedures      ED Course  ED Course as of 02/24/23 0705   e Feb 21, 2023 2145 No evidence of impending AW compromise at this time  Pain improving s/p morphine administration  Will sign Pt out for continued care with the preliminary plan to d/c on PO abx, w/ dental f/u if she feels fine and CT shows minimal invasion of her infection; vs  Admission for IV abx  and inpatient dental evaluation  SBIRT 20yo+    Flowsheet Row Most Recent Value   SBIRT (23 yo +)    In order to provide better care to our patients, we are screening all of our patients for alcohol and drug use  Would it be okay to ask you these screening questions? Unable to answer at this time Filed at: 02/21/2023 6031                Medical Decision Making  CT pending, which will help determine disposition of the Pt   She remained stable while under my care without further progression of her presenting illness  Pt was signed out to Dr Lali Barnes who will continue to manage the Pt  Dental abscess: self-limited or minor problem  Amount and/or Complexity of Data Reviewed  Labs: ordered  Radiology: ordered  Risk  OTC drugs  Prescription drug management  Decision regarding hospitalization  Disposition  Final diagnoses:   Dental abscess     Time reflects when diagnosis was documented in both MDM as applicable and the Disposition within this note     Time User Action Codes Description Comment    2/21/2023  9:53 PM Efrain Mejía Add [K04 7] Dental abscess     2/22/2023  6:21 PM Means, [address]  Modify [K04 7] Dental abscess       ED Disposition     ED Disposition   Admit    Condition   Stable    Date/Time   Wed Feb 22, 2023 12:06 AM    Comment   Case was discussed with Enrique Castanon and the patient's admission status was agreed to be Admission Status: observation status to the service of Dr Darlin Roa   Follow-up Information    None         There are no discharge medications for this patient  No discharge procedures on file  PDMP Review     None           ED Provider  Attending physically available and evaluated Maile Manriquez I managed the patient along with the ED Attending      Electronically Signed by         Perry Soares MD  02/24/23 4072

## 2023-02-22 NOTE — ANESTHESIA PREPROCEDURE EVALUATION
Procedure:  EXTRACTION TEETH MULTIPLE (Mouth)  INCISION AND DRAINAGE (I&D) HEAD/FACE (Left: Face)    Relevant Problems   No relevant active problems        Physical Exam    Airway    Mallampati score: II  TM Distance: >3 FB  Neck ROM: full     Dental       Cardiovascular      Pulmonary      Other Findings        Anesthesia Plan  ASA Score- 2     Anesthesia Type- general with ASA Monitors  Additional Monitors:   Airway Plan: ETT  Plan Factors-    Chart reviewed  Induction- intravenous  Postoperative Plan-     Informed Consent- Anesthetic plan and risks discussed with patient  I personally reviewed this patient with the CRNA  Discussed and agreed on the Anesthesia Plan with the CRNA  Amaya Mills

## 2023-02-22 NOTE — ED ATTENDING ATTESTATION
2/21/2023  ILiz MD, saw and evaluated the patient  I have discussed the patient with the resident/non-physician practitioner and agree with the resident's/non-physician practitioner's findings, Plan of Care, and MDM as documented in the resident's/non-physician practitioner's note, except where noted  All available labs and Radiology studies were reviewed  I was present for key portions of any procedure(s) performed by the resident/non-physician practitioner and I was immediately available to provide assistance  At this point I agree with the current assessment done in the Emergency Department  I have conducted an independent evaluation of this patient a history and physical is as follows: 61-year-old female presents emergency department for evaluation of left lower tooth pain that is now extending into the left inferior aspect of her mandible  Describes fevers, chills  Denies any voice change  Denies any difficulty swallowing  Denies any cough, chest pain, abdominal pain, nausea, vomiting, diarrhea  Care to Dr Ashley Guzman at [phone]  Plan to follow-up on CT imaging, likely admission  Results Reviewed     Procedure Component Value Units Date/Time    Blood culture #1 [[id number]] Collected: 02/21/23 2000    Lab Status: Preliminary result Specimen: Blood from Arm, Right Updated: 02/24/23 0001     Blood Culture No Growth at 48 hrs  Blood culture #2 [[id number]] Collected: 02/21/23 2000    Lab Status: Preliminary result Specimen: Blood from Arm, Left Updated: 02/24/23 0001     Blood Culture No Growth at 48 hrs      Procalcitonin [[id number]]  (Abnormal) Collected: 02/21/23 2000    Lab Status: Final result Specimen: Blood from Arm, Right Updated: 02/21/23 2042     Procalcitonin 0 31 ng/ml     Lactic acid [[id number]]  (Normal) Collected: 02/21/23 2000    Lab Status: Final result Specimen: Blood from Arm, Right Updated: 02/21/23 2036     LACTIC ACID 1 1 mmol/L     Narrative:      Result may be elevated if tourniquet was used during collection      Comprehensive metabolic panel [[id number]]  (Abnormal) Collected: 02/21/23 2000    Lab Status: Final result Specimen: Blood from Arm, Right Updated: 02/21/23 2035     Sodium 134 mmol/L      Potassium 3 7 mmol/L      Chloride 102 mmol/L      CO2 21 mmol/L      ANION GAP 11 mmol/L      BUN 11 mg/dL      Creatinine 0 87 mg/dL      Glucose 95 mg/dL      Calcium 9 4 mg/dL      AST 14 U/L      ALT 8 U/L      Alkaline Phosphatase 49 U/L      Total Protein 7 2 g/dL      Albumin 4 2 g/dL      Total Bilirubin 0 52 mg/dL      eGFR 77 ml/min/1 73sq m     Narrative:      [facility] guidelines for Chronic Kidney Disease (CKD):   •  Stage 1 with normal or high GFR (GFR > 90 mL/min/1 73 square meters)  •  Stage 2 Mild CKD (GFR = 60-89 mL/min/1 73 square meters)  •  Stage 3A Moderate CKD (GFR = 45-59 mL/min/1 73 square meters)  •  Stage 3B Moderate CKD (GFR = 30-44 mL/min/1 73 square meters)  •  Stage 4 Severe CKD (GFR = 15-29 mL/min/1 73 square meters)  •  Stage 5 End Stage CKD (GFR <15 mL/min/1 73 square meters)  Note: GFR calculation is accurate only with a steady state creatinine    Protime-INR [[id number]]  (Normal) Collected: 02/21/23 2000    Lab Status: Final result Specimen: Blood from Arm, Right Updated: 02/21/23 2028     Protime 13 7 seconds      INR 1 03    APTT [[id number]]  (Normal) Collected: 02/21/23 2000    Lab Status: Final result Specimen: Blood from Arm, Right Updated: 02/21/23 2028     PTT 30 seconds     CBC and differential [[id number]]  (Abnormal) Collected: 02/21/23 2000    Lab Status: Final result Specimen: Blood from Arm, Right Updated: 02/21/23 2014     WBC 8 39 Thousand/uL      RBC 4 21 Million/uL      Hemoglobin 13 4 g/dL      Hematocrit 39 3 %      MCV 93 fL      MCH 31 8 pg      MCHC 34 1 g/dL      RDW 12 3 %      MPV 10 5 fL      Platelets 041 Thousands/uL      nRBC 0 /100 WBCs      Neutrophils Relative 83 %      Immat GRANS % 0 %      Lymphocytes Relative 9 %      Monocytes Relative 7 %      Eosinophils Relative 0 %      Basophils Relative 1 %      Neutrophils Absolute 6 99 Thousands/µL      Immature Grans Absolute 0 03 Thousand/uL      Lymphocytes Absolute 0 74 Thousands/µL      Monocytes Absolute 0 57 Thousand/µL      Eosinophils Absolute 0 00 Thousand/µL      Basophils Absolute 0 06 Thousands/µL         CT soft tissue neck w contrast   Final Result by Estela Benson MD (02/21 2249)         1  Left mandibular subperiosteal 1 4 cm abscess along the lingual cortex of the mandible  No inflammation in the left sublingual or submandibular spaces or oral cavity  2   Periapical abscess involving the left mandibular 1st molar suggesting odontogenic source of infection              Workstation performed: [id number]               ED Course         Critical Care Time  Procedures

## 2023-02-22 NOTE — ASSESSMENT & PLAN NOTE
· Presented with left lower tooth pain, left sided facial swelling and subjective fevers/ chills  · CT neck: "Left mandibular subperiosteal 1 4 cm abscess along the lingual cortex of the mandible  No inflammation in the left sublingual or submandibular spaces or oral cavity  Periapical abscess involving the left mandibular 1st molar suggesting odontogenic source of infection "  · Received 1 dose of IV Unasyn in the ED  · Continue IV Unasyn  · OMFS consult  · Keep NPO  · Pain control     · Procal elevated to 0 31    · Does not meet sepsis criteria on admission; tachycardic but without leukocytosis, fever, tachypnea

## 2023-02-22 NOTE — CONSULTS
Patient Name: Guido Rayo YOB: 1970    Medical Record No : [id number]     Admit/Registration Date: 2/21/2023  7:18 PM  Date of Consult: 02/22/23      Oral and Maxillofacial Surgery Consult Note    Assessment:  48 y o  female with odontogenic infection a/w #19     Plan/Recs:  -Plan for OR for extraction and I&D with Dr Bhakti Gibbs this evening  -HOB 30 degrees  -Diet: NPO mIVF  -Antibiotics: Unasyn 3g q6h  -Peridex 0 2% rinse BID  -Warm salt water rinses  -Maintain good oral hygiene, brush teeth BID  -Encourage Tongue Blade Mouth Opening Exercises throughout day  -Encourage patient use of Incentive Spirometry q2h    -----------------------------------------    Chief Complaint:  "My tooth broke and is infected"    HPI:  48 y o  female who presents with lower left facial swelling and pain  Pt reports that LL molar broke on 2/18/23 and she placed OTC temp filling material in it  Pt started feeling dizzy 2days ago and started having pain and swelling yesterday  Pt has general dentist she sees outpt  She had maxillary abscess a few months ago that general dentist extracted and planned for implant  Denies f/c/n/v, dyspnea or dysphagia  Pre-admission records reviewed  PMH/PSH/Meds/Allergies Reviewed      Past Medical History:   Diagnosis Date   • Mitral valve disease      Past Surgical History:   Procedure Laterality Date   • SPINAL FUSION         Allergies   Allergen Reactions   • Sulfa Antibiotics Anaphylaxis and Other (See Comments)     Breathing difficulty     • Strawberry Extract - Food Allergy Hives       Social History     Socioeconomic History   • Marital status: Single     Spouse name: Not on file   • Number of children: Not on file   • Years of education: Not on file   • Highest education level: Not on file   Occupational History   • Not on file   Tobacco Use   • Smoking status: Never   • Smokeless tobacco: Never   Vaping Use   • Vaping Use: Never used   Substance and Sexual Activity   • Alcohol use: Yes     Comment: rare   • Drug use: Never   • Sexual activity: Not on file   Other Topics Concern   • Not on file   Social History Narrative   • Not on file     Social Determinants of Health     Financial Resource Strain: Not on file   Food Insecurity: Not on file   Transportation Needs: Not on file   Physical Activity: Not on file   Stress: Not on file   Social Connections: Not on file   Intimate Partner Violence: Not on file   Housing Stability: Not on file       Scheduled Medications  Current Facility-Administered Medications   Medication Dose Route Frequency Provider Last Rate   • acetaminophen  975 mg Oral Q8H DalsetSALAS lopez-C     • ampicillin-sulbactam  3 g Intravenous Q6H Guillaume Rush PA-C 3 g (02/22/23 0408)   • ketorolac  15 mg Intravenous Q6H PRN Guillaume Rush PA-C     • ketorolac  15 mg Intravenous Q6H PRN Trudy Tadeo PA-C     • oxyCODONE  5 mg Oral Q6H PRN Mike Mayorga PA-C         PRN Medications  •  ketorolac  •  ketorolac  •  oxyCODONE    Medication Infusions       Review of Systems   HENT: Positive for dental problem and facial swelling  Negative for trouble swallowing and voice change  Respiratory: Negative  Cardiovascular: Negative  Neurological: Positive for dizziness  Negative for numbness  Psychiatric/Behavioral: Negative  All other systems reviewed and are negative  Vitals:    Temp:  [98 4 °F (36 9 °C)-100 °F (37 8 °C)] 98 6 °F (37 °C)  HR:  [] 89  Resp:  [18-19] 19  BP: (101-132)/(66-87) 101/67  Wt Readings from Last 1 Encounters:   02/22/23 49 2 kg (108 lb 7 5 oz)     Ht Readings from Last 1 Encounters:   02/22/23 5' 1" (1 549 m)     Body mass index is 20 49 kg/m²    Respiratory    Lab Data (Last 4 hours)    None         O2/Vent Data (Last 4 hours)    None              Patient Lines/Drains/Airways Status     Active Airway     None              I/O:  Current Diet Order:        Diet Orders   (From admission, onward) Start     Ordered    02/22/23 0254  Diet NPO; Sips with meds  Diet effective now        References:    Nutrtion Support Algorithm Enteral vs  Parenteral   Question Answer Comment   Diet Type NPO    NPO Except: Sips with meds    RD to adjust diet per protocol? No        02/22/23 0253                 Intake/Output Summary (Last 24 hours) at 2/22/2023 0823  Last data filed at 2/21/2023 2244  Gross per 24 hour   Intake 1100 ml   Output --   Net 1100 ml       Labs:  Results from last 7 days   Lab Units 02/22/23  0430 02/21/23 2000   WBC Thousand/uL 7 00 8 39   HEMOGLOBIN g/dL 11 7 13 4   HEMATOCRIT % 35 7 39 3   PLATELETS Thousands/uL 170 205     Results from last 7 days   Lab Units 02/22/23  0430 02/21/23 2000   POTASSIUM mmol/L 3 7 3 7   CHLORIDE mmol/L 107 102   CO2 mmol/L 22 21   BUN mg/dL 11 11   CREATININE mg/dL 0 91 0 87   CALCIUM mg/dL 8 2* 9 4     Results from last 7 days   Lab Units 02/21/23 2000   INR  1 03   PTT seconds 30         Pain Management Panel    There is no flowsheet data to display  Imaging:   CT NECK WITH CONTRAST     INDICATION:   Left mandibular tooth pain      COMPARISON:  None      TECHNIQUE:  Axial, sagittal, and coronal 2D reformatted images were created from the axial source data and submitted for interpretation      Radiation dose length product (DLP) for this visit:  495 mGy-cm     This examination, like all CT scans performed in the [facility], was performed utilizing techniques to minimize radiation dose exposure, including the use of iterative   reconstruction and automated exposure control      IV Contrast:  100 mL of iohexol (OMNIPAQUE)     IMAGE QUALITY:  Diagnostic      FINDINGS:     VISUALIZED BRAIN PARENCHYMA:  Normal enhancement      VISUALIZED ORBITS: Intact globes and orbits      PARANASAL SINUSES: Clear      No nasopharyngeal inflammation      SUPRAHYOID NECK:  Subperiosteal fluid collection along the lingual surface of the left mandible adjacent to the 1st molar measuring 1 4 x 0 4 x 1 1 cm      INFRAHYOID NECK:  Epiglottis, aryepiglottic folds and piriform sinuses are normal   Normal glottis and subglottic airway      THYROID GLAND:  Unremarkable      PAROTID AND SUBMANDIBULAR GLANDS:  Normal      LYMPH NODES:  No lymphadenopathy      VASCULAR STRUCTURES:  Normal enhancement of the cervical vasculature      THORACIC INLET:  Clear lung apices      BONY STRUCTURES: Lucency surrounding the roots of the remaining left mandibular molar suggesting a periapical abscess      IMPRESSION:        1  Left mandibular subperiosteal 1 4 cm abscess along the lingual cortex of the mandible  No inflammation in the left sublingual or submandibular spaces or oral cavity  2   Periapical abscess involving the left mandibular 1st molar suggesting odontogenic source of infection  Physical Exam:   General: Integment: skin warm and dry, patient is WD/WN, Voice quality: WNL, in NAD  Neuro Exam: AAO x 3, CN V,VII grossly intact  Head: Normocephalic, no scalp lacerations or hematoma   Face: No lacerations/Abrasions/Step Offs, Firm swelling L inferior border of mandible, w/ inferior border of mandible not palpable     Oral Exam:Lips and mucosal surfaces wnl, floor of mouth s/nt/ne, tongue protrusion is midline and has full range of motion, no pharyngeal edema or exudate, uvula midline  Dentalalveolar Exam: Caries #3,4,18,19,31 and occlusion stable, GM >35mm, lateral excursive movements wnl, no purulence or fistula  Lymph/Neck Exam: Neck is soft, trachea is midline, no gross cervical lymphadenopathy bilaterally        Court Sharma MD

## 2023-02-22 NOTE — ASSESSMENT & PLAN NOTE
· Presented with left lower tooth pain, left sided facial swelling and subjective fevers/ chills  · CT neck shows left mandibular subperiosteal 1 4 cm abscess along the lingual cortex of the mandible  No inflammation in the left sublingual or submandibular spaces or oral cavity  Periapical abscess involving the left mandibular 1st molar suggesting odontogenic source of infection  · Continue IV Unasyn  · OMFS consult  · Keep NPO  · Pain control     · Follow up blood cultures

## 2023-02-22 NOTE — ED CARE HANDOFF
Emergency Department Sign Out Note        Sign out and transfer of care from Dr Chip Steven  See Separate Emergency Department note  The patient, Jose Lin, was evaluated by the previous provider for Dental Abscess  Workup Completed:  Labs, Imaging    ED Course / Workup Pending (followup):  CT soft tissue neck w/ contrast results                                  ED Course as of 02/22/23 0656   Tue Feb 21, 2023   2202 S/O from Dr Chip Steven, tooth fx with likely dental abscess, pending CT soft tissue neck results  Considering Admission for airway precautions and IV abx vs d/c and dental follow up  Wed Feb 22, 2023   0000 CT soft tissue neck w contrast  1  Left mandibular subperiosteal 1 4 cm abscess along the lingual cortex of the mandible  No inflammation in the left sublingual or submandibular spaces or oral cavity  2   Periapical abscess involving the left mandibular 1st molar suggesting odontogenic source of infection  0010 Patient will be admitted and seen by oral surgery tomorrow  Procedures  MDM        Disposition  Final diagnoses:   Dental abscess     Time reflects when diagnosis was documented in both MDM as applicable and the Disposition within this note     Time User Action Codes Description Comment    2/21/2023  9:53 PM Thee Crenshaw Add [K04 7] Dental abscess       ED Disposition     ED Disposition   Admit    Condition   Stable    Date/Time   Wed Feb 22, 2023 12:06 AM    Comment   Case was discussed with Narendra Leslie and the patient's admission status was agreed to be Admission Status: observation status to the service of Dr Lea Ayala   Follow-up Information    None       There are no discharge medications for this patient  No discharge procedures on file         ED Provider  Electronically Signed by     Christa Myers MD  02/22/23 9077

## 2023-02-22 NOTE — ANESTHESIA POSTPROCEDURE EVALUATION
Post-Op Assessment Note    CV Status:  Stable    Pain management: adequate     Mental Status:  Awake and lethargic   Hydration Status:  Stable   PONV Controlled:  None   Airway Patency:  Patent      Post Op Vitals Reviewed: Yes      Staff: CRNA         No notable events documented      BP   140/79   Temp   97 4   Pulse  91   Resp   14   SpO2   99 volar

## 2023-02-22 NOTE — H&P
[facility]  H&P- Brandy Chow 1970, 48 y o  female MRN: [id number]  Unit/Bed#: ED-28 Encounter: [id number]  Primary Care Provider: Ashly Hightower   Date and time admitted to hospital: 2/21/2023  7:18 PM    * Dental abscess  Assessment & Plan  · Presented with left lower tooth pain, left sided facial swelling and subjective fevers/ chills  · CT neck: "Left mandibular subperiosteal 1 4 cm abscess along the lingual cortex of the mandible  No inflammation in the left sublingual or submandibular spaces or oral cavity  Periapical abscess involving the left mandibular 1st molar suggesting odontogenic source of infection "  · Received 1 dose of IV Unasyn in the ED  · Continue IV Unasyn  · OMFS consult  · Keep NPO  · Pain control  · Procal elevated to 0 31    · Does not meet sepsis criteria on admission; tachycardic but without leukocytosis, fever, tachypnea        Hyponatremia  Assessment & Plan  · Na 134 on presentation  · Received 1L bolus of NS in the ED  · Repeat BMP in AM      VTE Pharmacologic Prophylaxis: VTE Score: 2 Low Risk (Score 0-2) - Encourage Ambulation  Code Status: level 1 - full code  Discussion with family: Patient declined call to   Anticipated Length of Stay: Patient will be admitted on an observation basis with an anticipated length of stay of less than 2 midnights secondary to dental abscess  Total Time Spent on Date of Encounter in care of patient: 65 minutes This time was spent on one or more of the following: performing physical exam; counseling and coordination of care; obtaining or reviewing history; documenting in the medical record; reviewing/ordering tests, medications or procedures; communicating with other healthcare professionals and discussing with patient's family/caregivers  Chief Complaint: dental pain    History of Present Illness:  Brandy Chow is a 48 y o  female who presents with left sided dental pain  Patient reports that 4 days ago her left molar cracked and so she applied OTC artifical filling  She reports that 2 days ago she began to feel dizziness and nauseous and then yesterday developed chills and subjective fevers as well as dental pain and swelling on the left side of her mandible  She reports chronic dysphagia from prior neck surgery, scar tissue but denies any recent worsening of this  She denies SOB, cough, chest pain  Review of Systems:  Review of Systems   Constitutional: Positive for chills and fever (subjective)  HENT: Positive for dental problem, facial swelling and trouble swallowing (chronic from prior neck surgery)  Respiratory: Negative for cough and shortness of breath  Cardiovascular: Negative for chest pain  Gastrointestinal: Positive for nausea and vomiting  Neurological: Positive for dizziness  All other systems reviewed and are negative  Past Medical and Surgical History:   Past Medical History:   Diagnosis Date   • Mitral valve disease        Past Surgical History:   Procedure Laterality Date   • SPINAL FUSION         Meds/Allergies:  Prior to Admission medications    Not on File     I have reviewed home medications with patient personally  Allergies: Allergies   Allergen Reactions   • Sulfa Antibiotics Anaphylaxis and Other (See Comments)     Breathing difficulty     • Strawberry Extract - Food Allergy Hives       Social History:  Marital Status: Single   Occupation: teacher  Patient Pre-hospital Living Situation: Home  Patient Pre-hospital Level of Mobility: walks  Patient Pre-hospital Diet Restrictions:   Substance Use History:   Social History     Substance and Sexual Activity   Alcohol Use Yes    Comment: rare     Social History     Tobacco Use   Smoking Status Never   Smokeless Tobacco Never     Social History     Substance and Sexual Activity   Drug Use Never       Family History:  History reviewed  No pertinent family history      Physical Exam: Vitals:   Blood Pressure: 116/67 (02/22/23 0000)  Pulse: 81 (02/22/23 0000)  Temperature: 100 °F (37 8 °C) (02/21/23 1850)  Temp Source: Oral (02/21/23 1850)  Respirations: 18 (02/22/23 0000)  SpO2: 96 % (02/22/23 0000)    Physical Exam  Vitals and nursing note reviewed  Constitutional:       General: She is not in acute distress  Appearance: She is not diaphoretic  HENT:      Head: Normocephalic and atraumatic  Mouth/Throat:      Mouth: Mucous membranes are moist       Dentition: Abnormal dentition  Comments: Tenderness and swelling along left mandible  Eyes:      Conjunctiva/sclera: Conjunctivae normal    Cardiovascular:      Rate and Rhythm: Normal rate and regular rhythm  Pulmonary:      Effort: Pulmonary effort is normal  No respiratory distress  Breath sounds: Normal breath sounds  Abdominal:      General: Bowel sounds are normal       Palpations: Abdomen is soft  Tenderness: There is no abdominal tenderness  Musculoskeletal:      Right lower leg: No edema  Left lower leg: No edema  Skin:     General: Skin is warm and dry  Coloration: Skin is not pale  Neurological:      Mental Status: She is alert and oriented to person, place, and time     Psychiatric:         Mood and Affect: Mood normal           Additional Data:     Lab Results:  Results from last 7 days   Lab Units 02/21/23 2000   WBC Thousand/uL 8 39   HEMOGLOBIN g/dL 13 4   HEMATOCRIT % 39 3   PLATELETS Thousands/uL 205   NEUTROS PCT % 83*   LYMPHS PCT % 9*   MONOS PCT % 7   EOS PCT % 0     Results from last 7 days   Lab Units 02/21/23 2000   SODIUM mmol/L 134*   POTASSIUM mmol/L 3 7   CHLORIDE mmol/L 102   CO2 mmol/L 21   BUN mg/dL 11   CREATININE mg/dL 0 87   ANION GAP mmol/L 11   CALCIUM mg/dL 9 4   ALBUMIN g/dL 4 2   TOTAL BILIRUBIN mg/dL 0 52   ALK PHOS U/L 49   ALT U/L 8   AST U/L 14   GLUCOSE RANDOM mg/dL 95     Results from last 7 days   Lab Units 02/21/23 2000   INR  1 03 Results from last 7 days   Lab Units 02/21/23 2000   LACTIC ACID mmol/L 1 1   PROCALCITONIN ng/ml 0 31*       Lines/Drains:  Invasive Devices     Peripheral Intravenous Line  Duration           Peripheral IV 02/21/23 Proximal;Right;Ventral (anterior) Forearm <1 day                    Imaging: Reviewed radiology reports from this admission including: CT neck  CT soft tissue neck w contrast   Final Result by Crystal Hernandez MD (02/21 2249)         1  Left mandibular subperiosteal 1 4 cm abscess along the lingual cortex of the mandible  No inflammation in the left sublingual or submandibular spaces or oral cavity  2   Periapical abscess involving the left mandibular 1st molar suggesting odontogenic source of infection  Workstation performed: [id number]             EKG and Other Studies Reviewed on Admission:   · EKG: No EKG obtained  ** Please Note: This note has been constructed using a voice recognition system   **

## 2023-02-23 LAB
ANION GAP SERPL CALCULATED.3IONS-SCNC: 13 MMOL/L (ref 4–13)
BUN SERPL-MCNC: 19 MG/DL (ref 5–25)
CALCIUM SERPL-MCNC: 8.7 MG/DL (ref 8.4–10.2)
CHLORIDE SERPL-SCNC: 106 MMOL/L (ref 96–108)
CO2 SERPL-SCNC: 18 MMOL/L (ref 21–32)
CREAT SERPL-MCNC: 0.79 MG/DL (ref 0.6–1.3)
ERYTHROCYTE [DISTWIDTH] IN BLOOD BY AUTOMATED COUNT: 12.4 % (ref 11.6–15.1)
GFR SERPL CREATININE-BSD FRML MDRD: 86 ML/MIN/1.73SQ M
GLUCOSE P FAST SERPL-MCNC: 98 MG/DL (ref 65–99)
GLUCOSE SERPL-MCNC: 98 MG/DL (ref 65–140)
HCT VFR BLD AUTO: 37 % (ref 34.8–46.1)
HGB BLD-MCNC: 12.2 G/DL (ref 11.5–15.4)
MCH RBC QN AUTO: 31.6 PG (ref 26.8–34.3)
MCHC RBC AUTO-ENTMCNC: 33 G/DL (ref 31.4–37.4)
MCV RBC AUTO: 96 FL (ref 82–98)
PLATELET # BLD AUTO: 168 THOUSANDS/UL (ref 149–390)
PMV BLD AUTO: 10.6 FL (ref 8.9–12.7)
POTASSIUM SERPL-SCNC: 4.6 MMOL/L (ref 3.5–5.3)
RBC # BLD AUTO: 3.86 MILLION/UL (ref 3.81–5.12)
SODIUM SERPL-SCNC: 137 MMOL/L (ref 135–147)
WBC # BLD AUTO: 11.44 THOUSAND/UL (ref 4.31–10.16)

## 2023-02-23 PROCEDURE — 80048 BASIC METABOLIC PNL TOTAL CA: CPT | Performed by: DENTIST

## 2023-02-23 PROCEDURE — 85027 COMPLETE CBC AUTOMATED: CPT | Performed by: DENTIST

## 2023-02-23 PROCEDURE — 99232 SBSQ HOSP IP/OBS MODERATE 35: CPT | Performed by: PHYSICIAN ASSISTANT

## 2023-02-23 RX ORDER — ONDANSETRON 2 MG/ML
4 INJECTION INTRAMUSCULAR; INTRAVENOUS EVERY 4 HOURS PRN
Status: DISCONTINUED | OUTPATIENT
Start: 2023-02-23 | End: 2023-02-24 | Stop reason: HOSPADM

## 2023-02-23 RX ORDER — KETOROLAC TROMETHAMINE 30 MG/ML
15 INJECTION, SOLUTION INTRAMUSCULAR; INTRAVENOUS EVERY 6 HOURS PRN
Status: DISPENSED | OUTPATIENT
Start: 2023-02-23 | End: 2023-02-24

## 2023-02-23 RX ADMIN — KETOROLAC TROMETHAMINE 15 MG: 30 INJECTION, SOLUTION INTRAMUSCULAR at 21:26

## 2023-02-23 RX ADMIN — Medication 2.5 MG: at 09:52

## 2023-02-23 RX ADMIN — KETOROLAC TROMETHAMINE 15 MG: 30 INJECTION, SOLUTION INTRAMUSCULAR at 05:07

## 2023-02-23 RX ADMIN — KETOROLAC TROMETHAMINE 15 MG: 30 INJECTION, SOLUTION INTRAMUSCULAR at 13:55

## 2023-02-23 RX ADMIN — ACETAMINOPHEN 975 MG: 325 TABLET ORAL at 13:55

## 2023-02-23 RX ADMIN — SODIUM CHLORIDE 3 G: 9 INJECTION, SOLUTION INTRAVENOUS at 05:11

## 2023-02-23 RX ADMIN — ONDANSETRON 4 MG: 2 INJECTION INTRAMUSCULAR; INTRAVENOUS at 19:41

## 2023-02-23 RX ADMIN — SODIUM CHLORIDE 3 G: 9 INJECTION, SOLUTION INTRAVENOUS at 12:52

## 2023-02-23 RX ADMIN — Medication 2.5 MG: at 17:52

## 2023-02-23 RX ADMIN — ACETAMINOPHEN 975 MG: 325 TABLET ORAL at 05:07

## 2023-02-23 RX ADMIN — ACETAMINOPHEN 975 MG: 325 TABLET ORAL at 21:05

## 2023-02-23 RX ADMIN — SODIUM CHLORIDE 3 G: 9 INJECTION, SOLUTION INTRAVENOUS at 23:24

## 2023-02-23 RX ADMIN — SODIUM CHLORIDE 3 G: 9 INJECTION, SOLUTION INTRAVENOUS at 17:47

## 2023-02-23 NOTE — PROGRESS NOTES
[facility]  Progress Note - Avinash Reyes 1970, 48 y o  female MRN: [id number]  Unit/Bed#: W -01 Encounter: [id number]  Primary Care Provider: Amador Cintron   Date and time admitted to hospital: 2/21/2023  7:18 PM    * Dental abscess  Assessment & Plan  · Presented with left lower tooth pain, left sided facial swelling and subjective fevers/ chills  · CT neck shows left mandibular subperiosteal 1 4 cm abscess along the lingual cortex of the mandible  No inflammation in the left sublingual or submandibular spaces or oral cavity  Periapical abscess involving the left mandibular 1st molar suggesting odontogenic source of infection  · Status post teeth removal and drainage   · Blood cultures negative   · OP cultures negative   · Continue IV Unasyn  · OMFS consult  · Advance diet as tolerated   · Pain control         Hyponatremia  Assessment & Plan  · Na 134 on presentation  Now resolved   · Received 1L bolus of NS in the ED  · Monitor BMP           VTE Pharmacologic Prophylaxis: VTE Score: 1 Low Risk (Score 0-2) - Encourage Ambulation  Patient Centered Rounds: I performed bedside rounds with nursing staff today  Discussions with Specialists or Other Care Team Provider: Discussed with RN, CM    Education and Discussions with Family / Patient: Patient declined call to   Total Time Spent on Date of Encounter in care of patient: 35 minutes This time was spent on one or more of the following: performing physical exam; counseling and coordination of care; obtaining or reviewing history; documenting in the medical record; reviewing/ordering tests, medications or procedures; communicating with other healthcare professionals and discussing with patient's family/caregivers      Current Length of Stay: 0 day(s)  Current Patient Status: Observation   Certification Statement: The patient will continue to require additional inpatient hospital stay due to further IV antibiotics monitoring post-op  Discharge Plan: Anticipate discharge tomorrow to home  Code Status: Level 1 - Full Code    Subjective:   Patient reports feeling that her pain is controlled with medications  Able to get soft foods and liquids down  Denies fevers or chills  Wants to go home tomorrow  Objective:     Vitals:   Temp (24hrs), Av 8 °F (36 6 °C), Min:96 9 °F (36 1 °C), Max:98 5 °F (36 9 °C)    Temp:  [96 9 °F (36 1 °C)-98 5 °F (36 9 °C)] 98 °F (36 7 °C)  HR:  [65-96] 71  Resp:  [14-20] 18  BP: ()/(61-98) 121/73  SpO2:  [93 %-100 %] 96 %  Body mass index is 20 49 kg/m²  Input and Output Summary (last 24 hours): Intake/Output Summary (Last 24 hours) at 2023 1310  Last data filed at 2023 1814  Gross per 24 hour   Intake 500 ml   Output --   Net 500 ml       Physical Exam:   Physical Exam  Constitutional:       General: She is not in acute distress  Appearance: Normal appearance  She is normal weight  She is not ill-appearing or diaphoretic  HENT:      Head: Normocephalic and atraumatic  Mouth/Throat:      Mouth: Mucous membranes are moist       Dentition: Abnormal dentition  Eyes:      General: No scleral icterus  Pupils: Pupils are equal, round, and reactive to light  Cardiovascular:      Rate and Rhythm: Normal rate and regular rhythm  Pulses: Normal pulses  Heart sounds: Normal heart sounds, S1 normal and S2 normal  No murmur heard  No systolic murmur is present  No diastolic murmur is present  No gallop  No S3 or S4 sounds  Pulmonary:      Effort: Pulmonary effort is normal  No accessory muscle usage or respiratory distress  Breath sounds: Normal breath sounds  No stridor  No decreased breath sounds, wheezing, rhonchi or rales  Chest:      Chest wall: No tenderness  Abdominal:      General: Bowel sounds are normal  There is no distension  Palpations: Abdomen is soft  Tenderness:  There is no abdominal tenderness  There is no guarding  Musculoskeletal:      Right lower leg: No edema  Left lower leg: No edema  Skin:     General: Skin is warm and dry  Coloration: Skin is not jaundiced  Neurological:      General: No focal deficit present  Mental Status: She is alert  Mental status is at baseline  Motor: No tremor or seizure activity  Psychiatric:         Behavior: Behavior is cooperative  Additional Data:     Labs:  Results from last 7 days   Lab Units 02/23/23  0554 02/22/23  0430   WBC Thousand/uL 11 44* 7 00   HEMOGLOBIN g/dL 12 2 11 7   HEMATOCRIT % 37 0 35 7   PLATELETS Thousands/uL 168 170   NEUTROS PCT %  --  62   LYMPHS PCT %  --  21   MONOS PCT %  --  14*   EOS PCT %  --  1     Results from last 7 days   Lab Units 02/23/23  0554 02/22/23  0430 02/21/23 2000   SODIUM mmol/L 137   < > 134*   POTASSIUM mmol/L 4 6   < > 3 7   CHLORIDE mmol/L 106   < > 102   CO2 mmol/L 18*   < > 21   BUN mg/dL 19   < > 11   CREATININE mg/dL 0 79   < > 0 87   ANION GAP mmol/L 13   < > 11   CALCIUM mg/dL 8 7   < > 9 4   ALBUMIN g/dL  --   --  4 2   TOTAL BILIRUBIN mg/dL  --   --  0 52   ALK PHOS U/L  --   --  49   ALT U/L  --   --  8   AST U/L  --   --  14   GLUCOSE RANDOM mg/dL 98   < > 95    < > = values in this interval not displayed  Results from last 7 days   Lab Units 02/21/23 2000   INR  1 03             Results from last 7 days   Lab Units 02/21/23 2000   LACTIC ACID mmol/L 1 1   PROCALCITONIN ng/ml 0 31*       Lines/Drains:  Invasive Devices     Peripheral Intravenous Line  Duration           Peripheral IV 02/21/23 Proximal;Right;Ventral (anterior) Forearm 1 day                      Imaging: No pertinent imaging reviewed  Recent Cultures (last 7 days):   Results from last 7 days   Lab Units 02/22/23  1820 02/21/23 2000   BLOOD CULTURE   --  No Growth at 24 hrs  No Growth at 24 hrs     GRAM STAIN RESULT  No Polys or Bacteria seen  --        Last 24 Hours Medication List: Current Facility-Administered Medications   Medication Dose Route Frequency Provider Last Rate   • acetaminophen  975 mg Oral [facility] Dashawn Milo, DMD     • ampicillin-sulbactam  3 g Intravenous Q6H Dashawn Weinero, DMD 3 g (02/23/23 1252)   • HYDROmorphone  0 2 mg Intravenous Q4H PRN FERNANDO Garcia     • ketorolac  15 mg Intravenous Q6H PRN Milagor Boswell PA-C     • oxyCODONE  2 5 mg Oral Q6H PRN FERNANDO Garcia          Today, Patient Was Seen By: Milagro Boswell PA-C    **Please Note: This note may have been constructed using a voice recognition system  **

## 2023-02-23 NOTE — PROGRESS NOTES
Patient Name: Antoine Tarango YOB: 1970    Medical Record No : [id number]     Admit/Registration Date: 2/21/2023  7:18 PM  Date of Consult: 02/23/23      Oral and Maxillofacial Surgery ProogressNote    Assessment:  48 y o  female with POD one from left submandibular, sublingual ,submental spaces and extraction of tooth #18  Pt doing well overall  5/10 pain  Ambulating, voiding tolerating PO  Plan/Recs:  - Cont Unasyn while IP and Dc on augmentin for total of 10 day course  - Warm compress to left mandible prn for 2 days, then switch to cold compress   - Pain medication per primary team   - follow up OR cultures  - Simple 4x4 gauze dressing to left neck incision prn, change if saturated  -----------------------------------------    HPI:  48 y o  female who presents with left lower facial swelling and pain a w tooth #18  CT shows left submandibular space collection  PT now POD1 form intraoral and extraoral incision and drainage and extraction of tooth #18, WBC 11 4 from 7  Afebrile, VSS  Interval   - pt doing well post op with mild to moderate pain  - ambulating, voiding, tolerating PO   - No purulent drainage from surgical site  Pre-admission records reviewed  PMH/PSH/Meds/Allergies Reviewed      Past Medical History:   Diagnosis Date   • Mitral valve disease      Past Surgical History:   Procedure Laterality Date   • CYSTOSCOPY     • SPINAL FUSION         Allergies   Allergen Reactions   • Sulfa Antibiotics Anaphylaxis and Other (See Comments)     Breathing difficulty     • Strawberry Extract - Food Allergy Hives           Scheduled Medications  Current Facility-Administered Medications   Medication Dose Route Frequency Provider Last Rate   • acetaminophen  975 mg Oral [facility] Maureen Concepcion DMD     • ampicillin-sulbactam  3 g Intravenous Q6H Maureen Concepcion DMD 3 g (02/23/23 0511)   • HYDROmorphone  0 2 mg Intravenous Q4H PRN FERNANDO Aldridge     • oxyCODONE  2 5 mg Oral Q6H PRN FERNANDO Jenkins         PRN Medications  •  HYDROmorphone  •  oxyCODONE    Vitals:    Temp:  [96 9 °F (36 1 °C)-98 5 °F (36 9 °C)] 98 °F (36 7 °C)  HR:  [65-96] 71  Resp:  [14-20] 18  BP: ()/(61-98) 121/73  Wt Readings from Last 1 Encounters:   02/22/23 49 2 kg (108 lb 7 5 oz)     Ht Readings from Last 1 Encounters:   02/22/23 5' 1" (1 549 m)     Body mass index is 20 49 kg/m²    Respiratory    Lab Data (Last 4 hours)    None         O2/Vent Data (Last 4 hours)    None                  Cultures and Sensitivites:    Physical Exam:   General: Integment: skin warm and dry, patient is WD/WN, Voice quality: normal, in NAD  Neuro Exam: AAO x 3, CN V,VII grossly intact  Head: Normocephalic, no scalp lacerations or hematoma,   Face: No lacerations/Abrasions/Step Offs    Ears: Pinna wnl bilaterally, no otorrhea, hearing grossly intact,    Eyes/Periorbital: Pupils equal, round, reactive to light and Extraocular movements intact  Nose: External nose symmetrical/no gross deformity,    Oral Exam: left oral-pharngeal pillar edema and ecchymosis c/w procedure w/o exudate , Lips and mucosal surfaces wnl, floor of mouth is soft with no palpable masses, tongue protrusion is midline and has full range of motion,   Dentalalveolar Exam: partially edentulous, with extraction socket #18 with appropriate coagulum , intact and hemostatic  , atraumatic and occlusion stable, GM 30mm, lateral excursive movements wnl,   Lymph/Neck Exam: Neck is soft, trachea is midline, no gross cervical lymphadenopathy bilaterally, left submandibular stab incision with minimal serosanguinous output,      Obdulia Richardson, DMD

## 2023-02-23 NOTE — UTILIZATION REVIEW
Initial Clinical Review  OBSERVATION  2/22/23 @0007 CONVERTED TO INPATIENT ADMISSION 2/23/23 @1540 DUE TO CONTINUED STAY REQUIRED TO EVALUATE AND TREAT PATIENT WITH DENTAL ABSCESS S/P TEETH REMOAVAL AND DRAINAGE WITH IV ABX, MONITOR TOLERANCE TO DIET ADVANCEMENT  PAIN CONTROL  Admission: Date/Time/Statement:   Admission Orders (From admission, onward)     Ordered        02/23/23 1540  Inpatient Admission  Once            02/22/23 0007  Place in Observation  Once                      Orders Placed This Encounter   Procedures   • Place in Observation     Standing Status:   Standing     Number of Occurrences:   1     Order Specific Question:   Level of Care     Answer:   Med Surg [16]   • Inpatient Admission     Standing Status:   Standing     Number of Occurrences:   1     Order Specific Question:   Level of Care     Answer:   Med Surg [16]     Order Specific Question:   Estimated length of stay     Answer:   More than 2 Midnights     Order Specific Question:   Certification     Answer:   I certify that inpatient services are medically necessary for this patient for a duration of greater than two midnights  See H&P and MD Progress Notes for additional information about the patient's course of treatment  ED Arrival Information     Expected   -    Arrival   2/21/2023 18:16    Acuity   Urgent            Means of arrival   Walk-In    Escorted by   Family Member    Service   Hospitalist    Admission type   Emergency            Arrival complaint   mouth pain            Chief Complaint   Patient presents with   • Dental Pain     PT lost a cap and now there might be an abscess  PT also c/o pain, chills, and feeling feverish       Initial Presentation: 48 y o  female with hx mitral valve disease, chronic dysphagia from prior neck sx who presents to ED from home with L sised dental pain  Pt reports cracking L molar 4 days ago and she applied OTC artificial filling    2 days ago started with dizziness and nauseous and then yesterday developed chills and subjective fevers as well as dental pain and swelling on the left side of her mandible  On exam, temp 100 0 F,tachycardic,  pt has tenderness and swelling along l mandible , abnormal dentition   Lab s- procal 0 31, sodium 134  CT soft tissue neck shows L mandibular sub periosteal abscess 1 4 cm, also periapical abscess involving L mandibular 1st molar  Pt given IVF,  IV analgesics, Iv abx , Iv antiemetic in ED  Pt admitted as OBS with dental abscess, hyponatremia  Plan - IV Unasyn , OMFS consult, NPO, pain control  F/U blood cxBMP in am     OMFS consult- left facial swelling odontogenic in origin  It appears tooth #19 is the source of infection  PLan for OR today as add on for extraction of  teeth and incision and drainage of left facial infection  Maintain IVF, NPO, IV Unasyn   Peridex mouth rinse BID, warm salt water rinses   Good oral hygiene  Encourage Tongue Blade Mouth Opening Exercises throughout day    2/22/23 @1811  Procedures:  Removal root tips tooth #18(it should be noted this was documented as 19 at the time of consultation however exam was limited secondary to trismus)  Extraoral incision and drainage left sublingual space  Extraoral incision drainage left submandibular space  Extraoral incision and drainage left  space  Extraoral incision and drainage submental space  Intraoral incision and drainage left sublingual space  Intraoral incision and drainage left submandibular space  Intraoral incision and drainage dentoalveolar structure associate with tooth #18  General anesthesia   OP findings Root tips tooth #18-       Date: 2/23 POD #1 Converted to IP  OMFS- WBC up to 11 4 from 7  Pt having pain   No purulent drainage fom surgical site   Dentalalveolar Exam: partially edentulous, with extraction socket #18 with appropriate coagulum , intact and hemostatic  , atraumatic and occlusion stable, GM 30mm, lateral excursive movements wnl,   Oral exam-left oral-pharngeal pillar edema and ecchymosis c/w procedure w/o exudate  L submandibular stab incision with minimal serosanguinous output -Gauze 44x4 drsg to L neck incision prn    Continue Iv Unasyn while IP, transition to Augmentin on d/c for total 10 days   Warm compresses L mandible x 2 days then start cold compress  Pain control  F/U OR cultures   Medicine- Blood cx neg, Operative cx neg   Diet advanced as meghann-pt reports able to get soft foods and liquids down  Pain controlled with prn IV and prn po analgesics  Pt continues on IV Unasyn  Date 2/24 day 2   Pt continues on IV Unasyn today  OP cultures with alpha hemolytic strep    Pt afebrile  Nauseated , receiving prn IV Zofran today   Pain5- 7/10 L sided facial  Pt received prn IV toradol this am  Has swellingh L jaw/ cheek, and L side neck    L chin incision   Pt written for d/c' today, will send homer on po augmentin for 7 more days to total 10 days abx   F/U OMFS as outpt           ED Triage Vitals [02/21/23 1850]   Temperature Pulse Respirations Blood Pressure SpO2   100 °F (37 8 °C) (!) 109 18 132/87 98 %      Temp Source Heart Rate Source Patient Position - Orthostatic VS BP Location FiO2 (%)   Oral Monitor Sitting Right arm --      Pain Score       10 - Worst Possible Pain          Wt Readings from Last 1 Encounters:   02/22/23 49 2 kg (108 lb 7 5 oz)     Additional Vital Signs:   Date/Time Temp Pulse Resp BP MAP (mmHg) SpO2   02/24/23 07:34:25 98 °F (36 7 °C) 58 17 107/65 79 97 %   02/23/23 21:58:11 97 7 °F (36 5 °C) 55 19 106/66 79 100 %   02/23/23 16:03:29 97 2 °F (36 2 °C) Abnormal  72 18 112/72 85 99 %     Date/Time Temp Pulse Resp BP MAP (mmHg) SpO2   02/23/23 07:59:10 98 °F (36 7 °C) 71 18 121/73 89 96 %   02/23/23 03:29:54 97 6 °F (36 4 °C) 65 14 99/63 75 97 %   02/22/23 22:11:09 -- 74 17 101/63 76 93 %   02/22/23 21:17:20 98 5 °F (36 9 °C) 74 19 102/66 78 94 %   02/22/23 20:12:48 98 °F (36 7 °C) 89 -- 140/89 106 94 %   02/22/23 1952 97 6 °F (36 4 °C) 88 20 161/95 -- 98 %   02/22/23 1945 -- 90 20 171/93 Abnormal  -- 98 %   02/22/23 1930 97 8 °F (36 6 °C) 92 18 169/98 126 97 %   02/22/23 1921 -- 92 20 163/97 -- 96 %   02/22/23 1915 -- 88 20 163/97 125 94 %   02/22/23 1905 -- 86 20 168/98 125 98 %   02/22/23 1900 -- 86 20 168/98 125 98 %   02/22/23 1852 -- 86 20 164/96 122 99 %   02/22/23 1845 -- 90 20 164/96 122 100 %   02/22/23 1835 97 4 °F (36 3 °C) Abnormal  96 18 140/79 101 100 %   02/22/23 1629 98 °F (36 7 °C) 83 16 128/75 -- 95 %   02/22/23 15:13:22 96 9 °F (36 1 °C) Abnormal  78 17 98/61 73 96 %   02/22/23 08:21:16 98 6 °F (37 °C) 89 19 101/67 78 96 %   02/22/23 02:41:37 98 4 °F (36 9 °C) 66 18 128/84 99 98 %   02/22/23 0000 -- 81 18 116/67 83 96 %   02/21/23 2300 -- 84 18 106/66 80 96 %   02/21/23 2230 -- 89 18 106/66 81 97 %   02/21/23 2100 -- 103 18 111/66 81 98 %     Pertinent Labs/Diagnostic Test Results:   CT soft tissue neck w contrast   Final Result by Efren Mcqueen MD (02/21 2249)         1  Left mandibular subperiosteal 1 4 cm abscess along the lingual cortex of the mandible  No inflammation in the left sublingual or submandibular spaces or oral cavity  2   Periapical abscess involving the left mandibular 1st molar suggesting odontogenic source of infection              Workstation performed: [id number]               Results from last 7 days   Lab Units 02/23/23  0554 02/22/23  0430 02/21/23 2000   WBC Thousand/uL 11 44* 7 00 8 39   HEMOGLOBIN g/dL 12 2 11 7 13 4   HEMATOCRIT % 37 0 35 7 39 3   PLATELETS Thousands/uL 168 170 205   NEUTROS ABS Thousands/µL  --  4 35 6 99         Results from last 7 days   Lab Units 02/23/23  0554 02/22/23  0430 02/21/23 2000   SODIUM mmol/L 137 137 134*   POTASSIUM mmol/L 4 6 3 7 3 7   CHLORIDE mmol/L 106 107 102   CO2 mmol/L 18* 22 21   ANION GAP mmol/L 13 8 11   BUN mg/dL 19 11 11   CREATININE mg/dL 0 79 0 91 0 87   EGFR ml/min/1 73sq m 86 73 77   CALCIUM mg/dL 8 7 8 2* 9 4     Results from last 7 days   Lab Units 02/21/23 2000   AST U/L 14   ALT U/L 8   ALK PHOS U/L 49   TOTAL PROTEIN g/dL 7 2   ALBUMIN g/dL 4 2   TOTAL BILIRUBIN mg/dL 0 52         Results from last 7 days   Lab Units 02/23/23  0554 02/22/23  0430 02/21/23 2000   GLUCOSE RANDOM mg/dL 98 84 95               Results from last 7 days   Lab Units 02/21/23 2000   PROTIME seconds 13 7   INR  1 03   PTT seconds 30         Results from last 7 days   Lab Units 02/21/23 2000   PROCALCITONIN ng/ml 0 31*     Results from last 7 days   Lab Units 02/21/23 2000   LACTIC ACID mmol/L 1 1                   Results from last 7 days   Lab Units 02/22/23  1820 02/21/23 2000   BLOOD CULTURE   --  No Growth at 48 hrs  No Growth at 48 hrs     GRAM STAIN RESULT  No Polys or Bacteria seen  --                ED Treatment:   Medication Administration from 02/21/2023 1815 to 02/22/2023 0236       Date/Time Order Dose Route Action     02/21/2023 2001 EST ondansetron [facility]) injection 4 mg 4 mg Intravenous Given     02/21/2023 2145 EST sodium chloride 0 9 % bolus 1,000 mL 0 mL Intravenous Stopped     02/21/2023 2001 EST sodium chloride 0 9 % bolus 1,000 mL 1,000 mL Intravenous New Bag     02/21/2023 2001 EST morphine injection 4 mg 4 mg Intravenous Given     02/21/2023 2001 EST acetaminophen (TYLENOL) tablet 975 mg 975 mg Oral Given     02/21/2023 2058 EST morphine injection 2 mg 2 mg Intravenous Given     02/21/2023 2054 EST iohexol (OMNIPAQUE) 350 MG/ML injection (SINGLE-DOSE) 100 mL 100 mL Intravenous Given     02/21/2023 2204 EST ampicillin-sulbactam (UNASYN) 3 g in sodium chloride 0 9 % 100 mL IVPB 3 g Intravenous New Bag     02/22/2023 0027 EST morphine injection 2 mg 2 mg Intravenous Given     02/22/2023 0026 EST ketorolac (TORADOL) injection 15 mg 15 mg Intravenous Given        Past Medical History:   Diagnosis Date   • Mitral valve disease      Present on Admission:  • Dental abscess  • Hyponatremia      Admitting Diagnosis: Dental abscess [K04 7]  Pain, dental [K08 89]  Age/Sex: 48 y o  female  Admission Orders:  Scheduled Medications:  acetaminophen, 975 mg, Oral, Q8H [address]  ampicillin-sulbactam, 3 g, Intravenous, Q6H      Continuous IV Infusions:     PRN Meds:  HYDROmorphone, 0 2 mg, Intravenous, Q4H PRN  ketorolac, 15 mg, Intravenous, Q6H PRN x3  2/22 ,x3 2/23, x1 2/24  End: 02/24/23 1011  oxyCODONE, 2 5 mg, Oral, Q6H PRN x2 2/23  HYDROmorphone (DILAUDID) injection 0 5 mg  Dose: 0 5 mg  Freq: Every 5 minutes PRN Route: IV  PRN Reason: Pain - PACU  PRN Comment: Breakthrough Pain  First Line  Start: 02/22/23 1824 End: 02/22/23 1943 x4 2/22  aluminum-magnesium hydroxide-simethicone (MYLANTA) oral suspension 30 mL  Dose: 30 mL  Freq: Every 4 hours PRN Route: PO  PRN Reasons: indigestion,heartburn  Start: 02/24/23 1137 x1 2/24  ondansetron (ZOFRAN) injection 4 mg  Dose: 4 mg  Freq: Every 4 hours PRN Route: IV  PRN Reason: nausea  Start: 02/23/23 1935 x1 2/23, x2 2/24    OOB as meghann   Reg diet    IP CONSULT TO ORAL AND MAXILLOFACIAL SURGERY    Network Utilization Review Department  ATTENTION: Please call with any questions or concerns to [phone] and carefully listen to the prompts so that you are directed to the right person  All voicemails are confidential   [facility] all requests for admission clinical reviews, approved or denied determinations and any other requests to dedicated fax number below belonging to the campus where the patient is receiving treatment   List of dedicated fax numbers for the Facilities:  [address] DENIALS (Administrative/Medical Necessity) [phone]   [address] (Maternity/NICU/Pediatrics) [phone]   [facility] Jaqui Ramírez [phone]   [facility] [address] [phone]   [address] [place] [address] - [facility] [address] [facility] [phone]   [address] Miamidonavan Steiner [facility] [address] [facility] [phone]

## 2023-02-23 NOTE — ASSESSMENT & PLAN NOTE
· Presented with left lower tooth pain, left sided facial swelling and subjective fevers/ chills  · CT neck shows left mandibular subperiosteal 1 4 cm abscess along the lingual cortex of the mandible  No inflammation in the left sublingual or submandibular spaces or oral cavity  Periapical abscess involving the left mandibular 1st molar suggesting odontogenic source of infection  · Status post teeth removal and drainage   · Blood cultures negative   · OP cultures negative   · Continue IV Unasyn  · OMFS consult     · Advance diet as tolerated   · Pain control

## 2023-02-24 VITALS
DIASTOLIC BLOOD PRESSURE: 65 MMHG | HEIGHT: 61 IN | HEART RATE: 58 BPM | RESPIRATION RATE: 17 BRPM | WEIGHT: 108.47 LBS | SYSTOLIC BLOOD PRESSURE: 107 MMHG | TEMPERATURE: 98 F | OXYGEN SATURATION: 97 % | BODY MASS INDEX: 20.48 KG/M2

## 2023-02-24 PROCEDURE — 99239 HOSP IP/OBS DSCHRG MGMT >30: CPT | Performed by: PHYSICIAN ASSISTANT

## 2023-02-24 RX ORDER — MAGNESIUM HYDROXIDE/ALUMINUM HYDROXICE/SIMETHICONE 120; 1200; 1200 MG/30ML; MG/30ML; MG/30ML
30 SUSPENSION ORAL EVERY 4 HOURS PRN
Status: DISCONTINUED | OUTPATIENT
Start: 2023-02-24 | End: 2023-02-24 | Stop reason: HOSPADM

## 2023-02-24 RX ORDER — AMOXICILLIN AND CLAVULANATE POTASSIUM 875; 125 MG/1; MG/1
1 TABLET, FILM COATED ORAL EVERY 12 HOURS SCHEDULED
Qty: 14 TABLET | Refills: 0 | Status: SHIPPED | OUTPATIENT
Start: 2023-02-25 | End: 2023-03-04

## 2023-02-24 RX ORDER — ONDANSETRON 4 MG/1
4 TABLET, ORALLY DISINTEGRATING ORAL EVERY 6 HOURS PRN
Qty: 20 TABLET | Refills: 0 | Status: SHIPPED | OUTPATIENT
Start: 2023-02-24

## 2023-02-24 RX ORDER — OXYCODONE HYDROCHLORIDE 5 MG/1
5 TABLET ORAL EVERY 6 HOURS PRN
Qty: 15 TABLET | Refills: 0 | Status: SHIPPED | OUTPATIENT
Start: 2023-02-24 | End: 2023-03-06

## 2023-02-24 RX ADMIN — ALUMINUM HYDROXIDE, MAGNESIUM HYDROXIDE, AND DIMETHICONE 30 ML: 200; 20; 200 SUSPENSION ORAL at 11:52

## 2023-02-24 RX ADMIN — ACETAMINOPHEN 975 MG: 325 TABLET ORAL at 05:46

## 2023-02-24 RX ADMIN — SODIUM CHLORIDE 3 G: 9 INJECTION, SOLUTION INTRAVENOUS at 12:46

## 2023-02-24 RX ADMIN — ONDANSETRON 4 MG: 2 INJECTION INTRAMUSCULAR; INTRAVENOUS at 01:47

## 2023-02-24 RX ADMIN — KETOROLAC TROMETHAMINE 15 MG: 30 INJECTION, SOLUTION INTRAMUSCULAR at 05:49

## 2023-02-24 RX ADMIN — SODIUM CHLORIDE 3 G: 9 INJECTION, SOLUTION INTRAVENOUS at 05:45

## 2023-02-24 RX ADMIN — ONDANSETRON 4 MG: 2 INJECTION INTRAMUSCULAR; INTRAVENOUS at 09:54

## 2023-02-24 NOTE — DISCHARGE INSTR - AVS FIRST PAGE
Dear Dana Marcus,     It was our pleasure to care for you here at [facility]  It is our hope that we were always able to exceed the expected standards for your care during your stay  You were hospitalized due to dental abscess  You were cared for on the [address] [place] [address] 4th floor by Chandni Blanco PA-C under the service of Zia Petersen MD with the [facility] who covers for your primary care physician (PCP), Slime Rand, while you were hospitalized  If you have any questions or concerns related to this hospitalization, you may contact us at [phone]  For follow up as well as any medication refills, we recommend that you follow up with your primary care physician  A registered nurse will reach out to you by phone within a few days after your discharge to answer any additional questions that you may have after going home  However, at this time we provide for you here, the most important instructions / recommendations at discharge:     Notable Medication Adjustments -   START The following medications:  Amoxicillin/Clavulanate (Augmentin) 875/125 mg tablets - Take 1 tablet by mouth twice daily with food   Ondansetron (Zofran) 4 mg ODT - Dissolve 1 tablet under the tongue every 6 hours as needed for nausea/vomiting  Oxycodone 5 mg tablets - Take 1 tablet by mouth every 6 hours as needed for mouth/jaw pain not relieved by Tylenol or Ibuprofen   Tylenol and or Ibuprofen per package instructions   Do not exceed 4000 mg of Tylenol or 2400 mg of ibuprofen in a day's time   Mylanta as needed per package instructions   Testing Required after Discharge -   None  Important follow up information -   Follow up with Oral Surgery - Dr Michelle Guerrero  Other Instructions -   None  Please review this entire after visit summary as additional general instructions including medication list, appointments, activity, diet, any pertinent wound care, and other additional recommendations from your care team that may be provided for you  Sincerely,     Katia Tony PA-C        POST OPERATIVE INSTRUCTIONS FOLLOWING ORAL SURGERY    Swelling: To reduce swelling, place ice bag on your face up to 12 hours following surgery  This is an important factor in keeping swelling to a minimum  Swelling is common and need not cause alarm  Rinsing: DO NOT RINSE for the first 12 hours after surgery  After 24 hours it is important to rinse, using warm salt water (not over the counter mouthwash) 3 to 4 times a day, particularly after eating  Brush areas of mouth not affected by the surgery starting tomorrow  Spitting: DO NOT SPIT OUT frequent spitting will cause bleeding to continue  Exercise Jaw: In some cases following oral surgery, it becomes difficult to open your mouth  Exercise your jaw frequently by attempting to open your mouth wide  You may experience discomfort at first, however, with continued exercise the discomfort is reduced  Diet: After having oral surgery it is recommended the patient maintain a semi-liquid diet for 24 hours  A regular diet should be resumed as soon as possible, avoiding peanuts, pretzels, and foods with seeds  Vomiting: Occasionally, patients will have nausea after surgery  Tea, ginger ale, and soup broth will help this complication  Pain: A prescription for pain relieving drugs is given when surgery is extensive  For lesser surgical procedures, it is recommended the patient use Motrin or Advil  If you are in pain and the drug you are taking does not help, please contact us and we will try to remedy the situation  Bleeding: Bite on gauze for 30 minutes  If the bleeding continues, bite on new gauze for an additional 30 minutes  If bleeding continues, place a damp tea bag over the socket and continue to bite down for an additional 30 minutes  Frequent gauze changes allow bleeding to continue      Incision: Wash incision while bathing/showering but do not scrub incision site  Replace clean dressing over incision site twice a day or after bathing  Smoking: It is important you DO NOT SMOKE after surgery  Smoking caused dry socket, which is very painful  Concerns: May call [address] for Oral Surgery and Implantology at [phone]  Emergency: Go to the [address] at [facility] and ask for the Oral Surgeon on call  DO NOT WAIT until your post-operative appointment to consult [facility] [phone]

## 2023-02-24 NOTE — DISCHARGE SUMMARY
[facility]  Discharge- Milagro Gill 1970, 48 y o  female MRN: [id number]  Unit/Bed#: W -01 Encounter: [id number]  Primary Care Provider: Chelle Patel   Date and time admitted to hospital: 2/21/2023  7:18 PM    * Dental abscess  Assessment & Plan  · Presented with left lower tooth pain, left sided facial swelling and subjective fevers/ chills  · CT neck shows left mandibular subperiosteal 1 4 cm abscess along the lingual cortex of the mandible  No inflammation in the left sublingual or submandibular spaces or oral cavity  Periapical abscess involving the left mandibular 1st molar suggesting odontogenic source of infection  · Status post teeth removal and drainage   · Blood cultures negative   · OP cultures with alpha hemolytic strep   · Stable for discharge    · Augmentin for 7 more days, 10 days total treatment    · OMFS consult  · Follow up outpatient    · Advance diet as tolerated   · Pain control         Hyponatremia  Assessment & Plan  · Na 134 on presentation  Now resolved   · Received 1L bolus of NS in the ED  Medical Problems     Resolved Problems  Date Reviewed: 2/24/2023   None       Discharging Physician / Practitioner: Fili Bonilla PA-C  PCP: Chelle Patel  Admission Date:   Admission Orders (From admission, onward)     Ordered        02/23/23 1540  Inpatient Admission  Once            02/22/23 0007  Place in Observation  Once                      Discharge Date: 02/24/23    Consultations During Hospital Stay:  · OMFS - Dr Sarvanthi Babcock     Procedures Performed:   · CT soft tissue neck  · Removal root tip tooth #18, multiple extra-oral drainages     Significant Findings / Test Results:   · CT soft tissue neck: Left mandibular subperiosteal 1 4 cm abscess along the lingual cortex of the mandible  No inflammation in the left sublingual or submandibular spaces or oral cavity   Periapical abscess involving the left mandibular 1st molar suggesting odontogenic source of infection  · Removal root tip tooth #18, multiple extra-oral drainages - see op note for details     Incidental Findings:   · None       Test Results Pending at Discharge (will require follow up): · None     Outpatient Tests Requested:  · None    Complications:  None    Reason for Admission: Dental Abscess    Hospital Course:   Guido Rayo is a 48 y o  female patient who originally presented to the hospital on 2/21/2023 due to facial swelling  CT in the ED showed dental abscess  Patient was admitted, started on IV Unasyn, OMFS was consulted, and she was made NPO  She was taken to the OR for extraction and drainage that was successful  Her operative cultures grew alpha hemolytic strep  She was kept on IV Antibiotics until discharge and will complete a course of Augmentin  She will follow up with OMFS in the outpatient setting  She did have some nausea/vomiting post-operatively which was controlled with anti-emetics  Please see above list of diagnoses and related plan for additional information  Condition at Discharge: stable    Discharge Day Visit / Exam:   Subjective:  Patient had nausea earlier today  Improved with Zofran and Mylanta  Pain controlled  Excited to go home  Vitals: Blood Pressure: 107/65 (02/24/23 0734)  Pulse: 58 (02/24/23 0734)  Temperature: 98 °F (36 7 °C) (02/24/23 0734)  Temp Source: Oral (02/23/23 0329)  Respirations: 17 (02/24/23 0734)  Height: 5' 1" (154 9 cm) (02/22/23 0100)  Weight - Scale: 49 2 kg (108 lb 7 5 oz) (02/22/23 0100)  SpO2: 97 % (02/24/23 0734)  Exam:   Physical Exam  Constitutional:       General: She is not in acute distress  Appearance: Normal appearance  She is normal weight  She is not ill-appearing or diaphoretic  HENT:      Head: Normocephalic and atraumatic  Mouth/Throat:      Mouth: Mucous membranes are moist    Eyes:      General: No scleral icterus  Pupils: Pupils are equal, round, and reactive to light  Cardiovascular:      Rate and Rhythm: Normal rate and regular rhythm  Pulses: Normal pulses  Heart sounds: Normal heart sounds, S1 normal and S2 normal  No murmur heard  No systolic murmur is present  No diastolic murmur is present  No gallop  No S3 or S4 sounds  Pulmonary:      Effort: Pulmonary effort is normal  No accessory muscle usage or respiratory distress  Breath sounds: Normal breath sounds  No stridor  No decreased breath sounds, wheezing, rhonchi or rales  Chest:      Chest wall: No tenderness  Abdominal:      General: Bowel sounds are normal  There is no distension  Palpations: Abdomen is soft  Tenderness: There is no abdominal tenderness  There is no guarding  Musculoskeletal:      Right lower leg: No edema  Left lower leg: No edema  Skin:     General: Skin is warm and dry  Coloration: Skin is not jaundiced  Neurological:      General: No focal deficit present  Mental Status: She is alert  Mental status is at baseline  Motor: No tremor or seizure activity  Psychiatric:         Behavior: Behavior is cooperative  Discussion with Family: Updated  (mother) at bedside  Discharge instructions/Information to patient and family:   See after visit summary for information provided to patient and family  Provisions for Follow-Up Care:  See after visit summary for information related to follow-up care and any pertinent home health orders  Disposition:   Home    Planned Readmission: None     Discharge Statement:  I spent 45 minutes discharging the patient  This time was spent on the day of discharge  I had direct contact with the patient on the day of discharge  Greater than 50% of the total time was spent examining patient, answering all patient questions, arranging and discussing plan of care with patient as well as directly providing post-discharge instructions    Additional time then spent on discharge activities  Discharge Medications:  See after visit summary for reconciled discharge medications provided to patient and/or family        **Please Note: This note may have been constructed using a voice recognition system**

## 2023-02-24 NOTE — ASSESSMENT & PLAN NOTE
· Presented with left lower tooth pain, left sided facial swelling and subjective fevers/ chills  · CT neck shows left mandibular subperiosteal 1 4 cm abscess along the lingual cortex of the mandible  No inflammation in the left sublingual or submandibular spaces or oral cavity  Periapical abscess involving the left mandibular 1st molar suggesting odontogenic source of infection  · Status post teeth removal and drainage   · Blood cultures negative   · OP cultures with alpha hemolytic strep   · Stable for discharge    · Augmentin for 7 more days, 10 days total treatment    · OMFS consult    · Follow up outpatient    · Advance diet as tolerated   · Pain control

## 2023-02-24 NOTE — PLAN OF CARE
Problem: SKIN/TISSUE INTEGRITY - ADULT  Goal: Incision(s), wounds(s) or drain site(s) healing without S/S of infection  Description: INTERVENTIONS  - Assess and document dressing, incision, wound bed, drain sites and surrounding tissue  - Provide patient and family education  - Perform skin care daily  Outcome: Progressing

## 2023-02-24 NOTE — UTILIZATION REVIEW
NOTIFICATION OF INPATIENT ADMISSION   AUTHORIZATION REQUEST   SERVICING FACILITY:   [address] Ascencio [facility] [place] [place], [address] [place] [address]  Tax ID: [id number]  NPI: [id number]   ATTENDING PROVIDER:  Attending Name and NPI#: Steven Longo Md [[id number]]  Address: [address] [place] [address] Dr Snou art  [facility], [address] [place] [address]  Phone: [phone]     ADMISSION INFORMATION:  Place of Service: Inpatient [address]  [address]  Place of Service Code: 21  Inpatient Admission Date/Time: 2/23/23  3:41 PM  Discharge Date/Time: No discharge date for patient encounter  Admitting Diagnosis Code/Description:  Dental abscess [K04 7]  Pain, dental [K08 89]     UTILIZATION REVIEW CONTACT:  Hunter Watson Utilization   Network Utilization Review Department  Phone: [phone]  Fax: [phone]  Email: [email or web address]  Contact for approvals/pending authorizations, clinical reviews, and discharge  PHYSICIAN ADVISORY SERVICES:  Medical Necessity Denial & [facility] Review  Phone: [phone]  Fax: [phone]  Email: [email or web address]

## 2023-02-25 LAB
BACTERIA TISS AEROBE CULT: NORMAL
GRAM STN SPEC: NORMAL

## 2023-02-26 LAB — BACTERIA SPEC ANAEROBE CULT: NORMAL

## 2023-02-27 LAB
BACTERIA BLD CULT: NORMAL
BACTERIA BLD CULT: NORMAL

## 2023-02-27 NOTE — UTILIZATION REVIEW
NOTIFICATION OF ADMISSION DISCHARGE   This is a Notification of Discharge from [facility]  Please be advised that this patient has been discharge from our facility  Below you will find the admission and discharge date and time including the patient’s disposition  UTILIZATION REVIEW CONTACT:  Alicia Bucio MA  Utilization   Network Utilization Review Department  Phone: [phone] x carefully listen to the prompts  All voicemails are confidential   Email: [email or web address]     ADMISSION INFORMATION  PRESENTATION DATE: 2/21/2023  7:18 PM  OBERVATION ADMISSION DATE:   INPATIENT ADMISSION DATE: 2/23/23  3:41 PM   DISCHARGE DATE: 2/24/2023  4:06 PM   DISPOSITION:Home/Self Care    IMPORTANT INFORMATION:  Send all requests for admission clinical reviews, approved or denied determinations and any other requests to dedicated fax number below belonging to the campus where the patient is receiving treatment   List of dedicated fax numbers:  [address] DENIALS (Administrative/Medical Necessity) [phone]   [address] (Maternity/NICU/Pediatrics) [phone]   [facility] [phone]   [facility] 87 [phone]   [address] [phone]   220 [facility] [phone] 90 [facility] [phone]   [address] [place] [address] 119 [phone]   [facility]  [phone]   [facility] [phone] 412 [facility] [address] [facility] [phone]

## (undated) DEVICE — TELFA ADHESIVE ISLAND DRESSING: Brand: TELFA

## (undated) DEVICE — 3M™ TEGADERM™ TRANSPARENT FILM DRESSING FRAME STYLE, 1624W, 2-3/8 IN X 2-3/4 IN (6 CM X 7 CM), 100/CT 4CT/CASE: Brand: 3M™ TEGADERM™

## (undated) DEVICE — SYRINGE 20ML LL

## (undated) DEVICE — OCCLUSIVE GAUZE STRIP,3% BISMUTH TRIBROMOPHENATE IN PETROLATUM BLEND: Brand: XEROFORM

## (undated) DEVICE — PENCIL ELECTROSURG E-Z CLEAN -0035H

## (undated) DEVICE — NEEDLE 25G X 1 1/2

## (undated) DEVICE — 2000CC GUARDIAN II: Brand: GUARDIAN

## (undated) DEVICE — PACK PBDS MANDIBLE RF

## (undated) DEVICE — INTENDED FOR TISSUE SEPARATION, AND OTHER PROCEDURES THAT REQUIRE A SHARP SURGICAL BLADE TO PUNCTURE OR CUT.: Brand: BARD-PARKER ® CARBON RIB-BACK BLADES

## (undated) DEVICE — INTENDED FOR TISSUE SEPARATION, AND OTHER PROCEDURES THAT REQUIRE A SHARP SURGICAL BLADE TO PUNCTURE OR CUT.: Brand: BARD-PARKER SAFETY BLADES SIZE 15, STERILE

## (undated) DEVICE — GLOVE SRG BIOGEL ORTHOPEDIC 7.5

## (undated) DEVICE — IV CATH 18 G X 1.16 IN

## (undated) DEVICE — DECANTER: Brand: UNBRANDED